# Patient Record
Sex: MALE | Race: WHITE | NOT HISPANIC OR LATINO | Employment: FULL TIME | URBAN - METROPOLITAN AREA
[De-identification: names, ages, dates, MRNs, and addresses within clinical notes are randomized per-mention and may not be internally consistent; named-entity substitution may affect disease eponyms.]

---

## 2021-08-25 ENCOUNTER — TELEPHONE (OUTPATIENT)
Dept: NEUROSURGERY | Facility: CLINIC | Age: 41
End: 2021-08-25

## 2021-08-25 NOTE — TELEPHONE ENCOUNTER
Writer LVM for pt to call back and schedule appt.    Please schedule a New, Virtual appt with Dr. Matson for next available. To schedule with Dr. Matson please use Dept:  ONC ADULT NEUROSRG [[id number]]    Jana Corbett

## 2021-08-26 ENCOUNTER — PRE VISIT (OUTPATIENT)
Dept: NEUROSURGERY | Facility: CLINIC | Age: 41
End: 2021-08-26

## 2021-08-26 ENCOUNTER — VIRTUAL VISIT (OUTPATIENT)
Dept: NEUROSURGERY | Facility: CLINIC | Age: 41
End: 2021-08-26
Attending: NEUROLOGICAL SURGERY
Payer: COMMERCIAL

## 2021-08-26 DIAGNOSIS — G93.89 BRAIN MASS: Primary | ICD-10-CM

## 2021-08-26 PROCEDURE — 99204 OFFICE O/P NEW MOD 45 MIN: CPT | Mod: GT | Performed by: NEUROLOGICAL SURGERY

## 2021-08-26 NOTE — TELEPHONE ENCOUNTER
FUTURE VISIT INFORMATION      FUTURE VISIT INFORMATION:    Date: 8/26/2021    Time: 2pm    Location: [facility] [place]  REFERRAL INFORMATION:    Referring provider:      Referring providers clinic:      Reason for visit/diagnosis      RECORDS REQUESTED FROM:       Clinic name Comments Records Status Imaging Status   [facility]   Care Everywhere Requested          [place]  CT Head-8/21/2021 Care Everywhere PACS                        8/26/2021-Request for ALL images faxed to [facility]-MR @ 710am    8/27/2021-[facility] Images now in PACS-MR @ 550am

## 2021-08-26 NOTE — PROGRESS NOTES
Patient Location [place]  How would you like to obtain your AVS? MyChart  If the video visit is dropped, the invitation should be resent by: Text to cell phone: [phone]  Will anyone else be joining your video visit? Wife    Neurosurgery Clinic Note    41 M with anaplastic oligodendroglioma initially diagnosed in 2014 (s/p subtotal resection followed by RT/TMZ and maintenance TMZ until 2015) who suffered multiple episodes of memory lapse and confusion over the past week. The episodes resolved after institution of Keppra therapy. MRI of the brain on 8/24/2021 revealed patchy new contrast enhancement in the right mesial temporal region as well as a new 4 mm focus of enhancement int right superior temporal gyrus. There is associated FLAIR hyper-intensity surrounding these regions of enhancement. The patient presents for surgical considerations.    Review of systems revealed no episodes of confusion since Keppra was escalated to 1000 mg BID.    Past Medical History:   Diagnosis Date     Elevated blood pressure reading without diagnosis of hypertension        Current Outpatient Medications:      doxycycline (VIBRAMYCIN) 100 MG capsule, Take 1 capsule by mouth 2 times daily. (Patient not taking: Reported on 8/26/2021), Disp: 28 capsule, Rfl: 0  Keppra 1000 mg BID    Allergies   Allergen Reactions     No Known Drug Allergies      Video examination grossly non-focal    MRI of the brain from 8/24/2021 is as above described    AP: 41 M with known anaplastic oligodendroglioma s/p TMZ/RT followed by TMZ, now with MRI findings worrisome for tumor progression. The mesial temporal lesion likely contributed to the risk for seizures. As a symptomatic lesion, I believe stereotactic needle biopsy is warranted. If the intra-operative finding revealed pathology consistent with recurrent tumor, laser ablation can be performed at the same time as means of tumor volume reduction to decrease risk for future seizures. I have reviewed this case  with the patient's neuro-oncologist, Dr. Vitale. Rationale, risks, and benefits of the procedure were reviewed with the patient and his wife. All questions were answered. Informed consent was obtained. I will proceed to schedule the procedure.

## 2021-08-26 NOTE — LETTER
8/26/2021         RE: Brent Pino  [address]  [place] [address]        Dear Colleague,    Thank you for referring your patient, Brent Pino, to the [facility]. Please see a copy of my visit note below.    Neurosurgery Clinic Note    41 M with anaplastic oligodendroglioma initially diagnosed in 2014 (s/p subtotal resection followed by RT/TMZ and maintenance TMZ until 2015) who suffered multiple episodes of memory lapse and confusion over the past week. The episodes resolved after institution of Keppra therapy. MRI of the brain on 8/24/2021 revealed patchy new contrast enhancement in the right mesial temporal region as well as a new 4 mm focus of enhancement int right superior temporal gyrus. There is associated FLAIR hyper-intensity surrounding these regions of enhancement. The patient presents for surgical considerations.    Review of systems revealed no episodes of confusion since Keppra was escalated to 1000 mg BID.    Past Medical History:   Diagnosis Date     Elevated blood pressure reading without diagnosis of hypertension        Current Outpatient Medications:      doxycycline (VIBRAMYCIN) 100 MG capsule, Take 1 capsule by mouth 2 times daily. (Patient not taking: Reported on 8/26/2021), Disp: 28 capsule, Rfl: 0  Keppra 1000 mg BID    Allergies   Allergen Reactions     No Known Drug Allergies      Video examination grossly non-focal    MRI of the brain from 8/24/2021 is as above described    AP: 41 M with known anaplastic oligodendroglioma s/p TMZ/RT followed by TMZ, now with MRI findings worrisome for tumor progression. The mesial temporal lesion likely contributed to the risk for seizures. As a symptomatic lesion, I believe stereotactic needle biopsy is warranted. If the intra-operative finding revealed pathology consistent with recurrent tumor, laser ablation can be performed at the same time as means of tumor volume reduction to decrease risk for  future seizures. I have reviewed this case with the patient's neuro-oncologist, Dr. Vitale. Rationale, risks, and benefits of the procedure were reviewed with the patient and his wife. All questions were answered. Informed consent was obtained. I will proceed to schedule the procedure.      Alejandro Matson MD

## 2021-08-27 ENCOUNTER — PREP FOR PROCEDURE (OUTPATIENT)
Dept: NEUROSURGERY | Facility: CLINIC | Age: 41
End: 2021-08-27

## 2021-08-27 DIAGNOSIS — C71.9 OLIGODENDROGLIOMA (H): Primary | ICD-10-CM

## 2021-08-30 ENCOUNTER — PATIENT OUTREACH (OUTPATIENT)
Dept: CARE COORDINATION | Facility: CLINIC | Age: 41
End: 2021-08-30

## 2021-08-30 NOTE — PROGRESS NOTES
Oncology Distress Screening Follow-up  Clinical Social Work  [facility]    Identified Concern and Score From Distress Screenin. How concerned are you about your ability to eat?   0           2. How concerned are you about unintended weight loss or your current weight?   0           3. How concerned are you about feeling depressed or very sad?   2           4. How concerned are you about feeling anxious or very scared?   3           5. Do you struggle with the loss of meaning and liang in your life?   Not at all           6. How concerned are you about work and home life issues that may be affected by your cancer?   5           7. How concerned are you about knowing what resources are available to help you?   7Abnormal            8. Do you currently have what you would describe as Jain or spiritual struggles?              Not at all           Date of Distress Screenin21    Intervention:   Brent is a 41-year-old with a diagnosis of anaplastic oligoendroglioma that was initially diagnosed in . Brent met with Dr. Matson at [facility] 21, at which time he scored positive on distress screen.     This clinician called Brent with intention of following up on elevated distress screen and introducing role of oncology social worker. This clinician left detailed voicemail with social work contact information and availability.     Education Provided:   Onc SW contact information    Follow-up Required:   SW will continue to be available as needed for ongoing psychosocial support. Will await return call.     IRISH Garcia, [facility] Care[facility]  Phone: [phone]  [facility]: M, Thu  *every other Tue, 8am-4:30pm  [facility]: W, F, *every other Tue, 8am-4:30pm

## 2021-08-31 DIAGNOSIS — Z11.59 ENCOUNTER FOR SCREENING FOR OTHER VIRAL DISEASES: ICD-10-CM

## 2021-08-31 PROBLEM — C71.9 OLIGODENDROGLIOMA (H): Status: ACTIVE | Noted: 2021-08-31

## 2021-08-31 NOTE — TELEPHONE ENCOUNTER
FUTURE VISIT INFORMATION      SURGERY INFORMATION:    Date: 21    Location: uu or    Surgeon:  Alejandro Matson MD    Anesthesia Type:  general    Procedure: MRI LASER ABLATION, WITH OPTICAL TRACKING SYSTEM AND STEREOTACTIC NEEDLE BIOPSY    Consult: Virtual visit 21    RECORDS REQUESTED FROM:       Primary Care Provider: Yong Gaitan MD  - [facility]    Most recent EKG+ Tracin21- [place]

## 2021-09-02 ENCOUNTER — PATIENT OUTREACH (OUTPATIENT)
Dept: ONCOLOGY | Facility: CLINIC | Age: 41
End: 2021-09-02

## 2021-09-02 ENCOUNTER — PRE VISIT (OUTPATIENT)
Dept: SURGERY | Facility: CLINIC | Age: 41
End: 2021-09-02

## 2021-09-02 ENCOUNTER — ANESTHESIA EVENT (OUTPATIENT)
Dept: SURGERY | Facility: CLINIC | Age: 41
DRG: 025 | End: 2021-09-02
Payer: COMMERCIAL

## 2021-09-02 ENCOUNTER — VIRTUAL VISIT (OUTPATIENT)
Dept: SURGERY | Facility: CLINIC | Age: 41
End: 2021-09-02
Payer: COMMERCIAL

## 2021-09-02 DIAGNOSIS — Z01.818 PREOP EXAMINATION: Primary | ICD-10-CM

## 2021-09-02 PROCEDURE — 99204 OFFICE O/P NEW MOD 45 MIN: CPT | Mod: GT | Performed by: PHYSICIAN ASSISTANT

## 2021-09-02 RX ORDER — ATORVASTATIN CALCIUM 40 MG/1
TABLET, FILM COATED ORAL EVERY EVENING
COMMUNITY
Start: 2021-01-20

## 2021-09-02 RX ORDER — IBUPROFEN 200 MG
600 TABLET ORAL DAILY PRN
Status: ON HOLD | COMMUNITY
End: 2021-09-09

## 2021-09-02 RX ORDER — SENNOSIDES 8.6 MG
1300 CAPSULE ORAL EVERY 8 HOURS PRN
COMMUNITY

## 2021-09-02 RX ORDER — LEVETIRACETAM 1000 MG/1
1000 TABLET ORAL
COMMUNITY
Start: 2021-08-25

## 2021-09-02 RX ORDER — SILDENAFIL CITRATE 20 MG/1
TABLET ORAL DAILY PRN
COMMUNITY
Start: 2020-05-28

## 2021-09-02 RX ORDER — HYDROCHLOROTHIAZIDE 12.5 MG/1
25 TABLET ORAL EVERY EVENING
COMMUNITY
Start: 2021-08-19

## 2021-09-02 ASSESSMENT — PAIN SCALES - GENERAL: PAINLEVEL: NO PAIN (0)

## 2021-09-02 ASSESSMENT — LIFESTYLE VARIABLES: TOBACCO_USE: 0

## 2021-09-02 NOTE — H&P
Pre-Operative H & P     CC:  Preoperative exam to assess for increased cardiopulmonary risk while undergoing surgery and anesthesia.    Date of Encounter: 9/2/2021  Primary Care Physician:  Yong Gaitan     Reason for Visit: Oligodendroglioma (H)       Phone-Visit Details    Type of service:  Phone Visit  ** visit switched to phone due to inability to connect via Doximity video.    Patient verbally consented to video service today: YES    Phone Start Time: 1028  Phone End Time (time video stopped): 1053    Originating Location (pt. Location): Home    Distant Location (provider location):  [facility]     Mode of Communication:  Video Conference via Doximity      HPI  Brent Pino is a 42 y/o male who presents for pre-operative H&P in preparation for INTRAOPERATIVE MAGNETIC RESONANCE IMAGING STEALTH ASSISTED (MONTERIS) LASER ABLATION, AND STEREOTACTIC NEEDLE BIOPSY with Alejandro Matson MD on 9/8/21 at [facility] for treatment of Oligodendroglioma (H). Patient is being evaluated for comorbid conditions of HTN, HLD, GERD, obesity.    Mr. Pino is a 41 male with anaplastic oligodendroglioma initially diagnosed in 2014 (s/p subtotal resection followed by RT/TMZ and maintenance TMZ until 2015) who suffered multiple episodes of memory lapse and confusion over the past week. The episodes resolved after institution of Keppra therapy. MRI of the brain on 8/24/2021 revealed patchy new contrast enhancement in the right mesial temporal region as well as a new 4 mm focus of enhancement int right superior temporal gyrus. There is associated FLAIR hyper-intensity surrounding these regions of enhancement. He now presents for the above procedure.    History was obtained from patient & chart review. He is accompanied by his wife.       Hx of abnormal bleeding or anti-platelet use: denies    Menstrual history: No LMP for male patient.:      Prior to Admission  Medications  Current Outpatient Medications   Medication Sig Dispense Refill     acetaminophen (TYLENOL) 650 MG CR tablet Take 1,300 mg by mouth every 8 hours as needed        atorvastatin (LIPITOR) 40 MG tablet every evening        hydrochlorothiazide (HYDRODIURIL) 12.5 MG tablet Take 25 mg by mouth every evening        ibuprofen (ADVIL/MOTRIN) 200 MG tablet Take 600 mg by mouth daily as needed        levETIRAcetam (KEPPRA) 1000 MG tablet Take 1,000 mg by mouth       omeprazole (PRILOSEC) 20 MG DR capsule every evening        sildenafil (REVATIO) 20 MG tablet daily as needed        doxycycline (VIBRAMYCIN) 100 MG capsule Take 1 capsule by mouth 2 times daily. (Patient not taking: Reported on 2021) 28 capsule 0       Family History  Family History   Problem Relation Age of Onset     Hypertension Mother      Lipids Mother      Hypertension Father      Lipids Father      Cardiovascular Maternal Grandmother 89     Myocardial Infarction Maternal Grandfather      Myocardial Infarction Paternal Grandfather 65     Anesthesia Reaction No family hx of      Deep Vein Thrombosis (DVT) No family hx of        The complete review of systems is negative other than noted in the HPI or here.       Anesthesia Pre-Procedure Evaluation    Patient: Brent Pino   MRN: [id number] : 1980        Preoperative Diagnosis: Oligodendroglioma (H) [C71.9]   Procedure : Procedure(s):  INTRAOPERATIVE MAGNETIC RESONANCE IMAGING STEALTH ASSISTED (MONTERIS) LASER ABLATION, AND STEREOTACTIC NEEDLE BIOPSY     Past Medical History:   Diagnosis Date     Gastroesophageal reflux disease without esophagitis      HLD (hyperlipidemia)      HTN (hypertension)      Oligodendroglioma (H)      Seizures (H)       Past Surgical History:   Procedure Laterality Date     ABDOMEN SURGERY  age 5 months.     ARTHROSCOPY KNEE Left      CHOLECYSTECTOMY        Allergies   Allergen Reactions     No Known Drug Allergies       Social History     Tobacco Use      Smoking status: Never Smoker     Smokeless tobacco: Never Used   Substance Use Topics     Alcohol use: Yes     Comment: Social      Wt Readings from Last 1 Encounters:   06/14/12 106.4 kg (234 lb 8 oz)            ROS/MED HX  The complete review of systems is negative other than noted in the HPI or here.  Patient denies recent illness, fever and respiratory infection during past month.  Pt denies steroid use during past year.    ENT/Pulmonary:  - neg pulmonary ROS  (-) tobacco use, asthma and sleep apnea   Neurologic: Comment: Oligodendroglioma, s/p resection 2014    Had multiple episodes of memory lapse and confusion over the past weeks. The episodes resolved after institution of Keppra therapy.   (-) no CVA   Cardiovascular:     (+) hypertension-----Previous cardiac testing     METS/Exercise Tolerance: >4 METS    Hematologic:  - neg hematologic  ROS  (-) history of blood clots and history of blood transfusion   Musculoskeletal: Comment: Left knee OA      GI/Hepatic: Comment: Hx exploratory laparotomy @ 5 months due to suspected ruptured spleen. Unremarkable - symptoms determined to be 2/2 influenza    (+) GERD, Asymptomatic on medication,  (-) liver disease   Renal/Genitourinary: Comment: Creatinine 1.39, GFR >60 on 8/25/21    Hypospadias - Pt has hx of difficult florence placement resulting in surgery delay        Endo:  - neg endo ROS  (-) Type II DM   Psychiatric/Substance Use:  - neg psychiatric ROS     Infectious Disease: Comment: +PPD age 3 y/o, s/p treatment (exposed via dental hygienist)        (+) TB,     Malignancy: Comment: Oligodendroglioma, s/p resection & chemo 2014. Recent imaging shows patchy new contrast enhancement in the right mesial temporal region & new 4 mm focus of enhancement int right superior temporal gyrus    (+) Malignancy, History of Neuro.Neuro CA Active status post Surgery and Chemo.     Other:  - neg other ROS            Virtual visit -  No vitals were obtained       Physical  Exam  Constitutional: Awake, alert, cooperative, no apparent distress, and appears stated age.  Neurologic: Awake, alert, oriented to name, place and time.   Neuropsychiatric: Calm, cooperative. Normal affect. Answers questions appropriately.    ** Patient's visit was done virtually today.  A full physical exam was not completed.  Please refer to the physical examination documented by the anesthesiologist in the anesthesia record on the day of surgery. **        PRIOR LABS/DIAGNOSTIC STUDIES:   All labs and imaging personally reviewed     EKG 8/22/21  NORMAL SINUS RHYTHM   LEFT AXIS DEVIATION   RIGHT BUNDLE BRANCH BLOCK   Bifascicular block   MODERATE VOLTAGE CRITERIA FOR LVH, MAY BE NORMAL VARIANT   ABNORMAL ECG   NO PREVIOUS ECGS AVAILABLE  Ventricular rate 70 bpm    SODIUM 136 - 145 mmol/L 139        POTASSIUM 3.5 - 5.1 mmol/L 4.0        CHLORIDE 98 - 112 mmol/L 105        CARBON DIOXIDE 21 - 32 mmol/L 28        BUN (UREA NITRO) 7 - 24 mg/dL 12        CREATININE 0.70 - 1.30 mg/dL 1.39High         EST GFR (CKD-EPI) >60 mL/min >60        EST GFR IF AFRICAN AM >60 mL/min >60        GLUCOSE 74 - 106 mg/dL 102        CALCIUM, SERUM 8.5 - 10.1 mg/dL 9.7        ANION GAP 0.0 - 15.0 mmol/L 6.0        Resulting Agency  [facility]   Specimen Collected: 08/25/21       WBC 4.3 - 10.8 K/uL 10.7        RBC 4.60 - 6.20 M/uL 5.37        HEMOGLOBIN 14.0 - 18.0 gm/dL 16.1        HEMATOCRIT 40.0 - 54.0 % 45.7        MCV 80 - 100 fl 85        MCH 27 - 33 pg 30        MCHC 33 - 36 gm/dL 35        RDW 11.5 - 14.5 % 12.5        PLATELET COUNT 150 - 400 K/        MPV 6.5 - 12  9.8        PMN %  % 53.8        IG% <=1.0 % 0.4        LYMPH %  % 34.0        MONO %  % 9.6        EOS %  % 1.7        BASO %  % 0.5        PMN ABSOLUTE 1.80 - 7.80 K/uL 5.75        IG ABSOLUTE  K/uL 0.04        LYMPH ABSOLUTE 1.00 - 4.00 K/uL 3.62        MONO ABSOLUTE 0.00 - 1.00 K/uL 1.02High         EOS ABSOLUTE 0.00 - 0.45 K/uL 0.18         BASO ABSOLUTE 0.00 - 0.20 K/uL 0.05        NUCL RBC % 0.0 - 0.0 /100 WBC 0.0        NUCL RBC ABSOLUTE 0.00 - 0.00 K/uL 0.00        Resulting Agency  [facility] LABORATORY   Specimen Collected: 08/25/21           The patient's records and results personally reviewed by this provider.     Outside records reviewed from: Care Everywhere (EKG @ [place]; Provider notes @ [facility])      COVID19 testing to be completed within 4 days of DOS in [place]      ASSESSMENT and PLAN  Brent Pino is a 40 y/o male who presents for pre-operative H&P in preparation for INTRAOPERATIVE MAGNETIC RESONANCE IMAGING STEALTH ASSISTED (MONTERIS) LASER ABLATION, AND STEREOTACTIC NEEDLE BIOPSY with Alejandro Matson MD on 9/8/21 at [facility] for treatment of Oligodendroglioma (H)     Pt has had prior anesthetic.     History of anesthetic complications:  PONV.      He has the following specific operative considerations:   # CUCA 2/8 = low risk  # VTE risk: 1.8%  # Risk of PONV score = 2.  If > 2, anti-emetic intervention recommended.  # Anesthesia considerations:  Refer to PAC assessment in anesthesia records      CARDIAC: METS >4      # RCRI : High risk surgery.  0.9% risk of major adverse cardiac event.     #  RBBB     #  HTN, will hold hydrochlorothiazide DOS      PULMONARY:     # Never smoked    # Denies asthma or inhaler use    GI:     # GERD, asymptomatic on prilosec     # Hx exploratory laparotomy @ 5 months due to suspected ruptured spleen. Unremarkable - symptoms determined to be 2/2 influenza      RENAL:    # Creatinine 1.39, GFR >60 on 8/25/21    # Hypospadias - Pt has hx of difficult florence placement resulting in surgery delay. Consider placement by urology.    ENDO: BMI 37   # No DM    IMMUNE:     # +PPD age 5 y/o, s/p treatment (exposed via dental hygienist)    NEURO/PSYCH:     # Oligodendroglioma, s/p resection 2014    # Had multiple episodes of memory lapse and confusion  over the past weeks. The episodes resolved after institution of Keppra therapy.       Patient is optimized and is acceptable candidate for the proposed procedure. No further diagnostic evaluation is needed.      ** Patient's visit was done virtually today.  A full physical exam was not completed.  Please refer to the physical examination documented by the anesthesiologist in the anesthesia record on the day of surgery. **    Final plan per anesthesiologist on day of surgery.     Arrival time, NPO, shower and medication instructions provided by nursing staff today.  Preparing For Your Surgery handout given.        On the day of service:     Prep time: 15 minutes  Visit time: 25 minutes  Documentation time: 12 minutes  ------------------------------------------  Total time: 52 minutes      Shasha Monae PA-C  Preoperative Assessment Center  [facility]  Clinic and Surgery Center  Phone: [phone]  Fax: [phone]

## 2021-09-02 NOTE — TELEPHONE ENCOUNTER
Order for Covid test faxed to [facility] F: [phone] P: [phone]. Fax confirmation received.    Juana Acevedo, RN, BSN  Neuro-Oncology Care Coordinator  [facility]

## 2021-09-02 NOTE — TELEPHONE ENCOUNTER
FUTURE VISIT INFORMATION        SURGERY INFORMATION:    Date: 21    Location: uu or    Surgeon:  Alejandro Matson MD    Anesthesia Type:  general    Procedure: MRI LASER ABLATION, WITH OPTICAL TRACKING SYSTEM AND STEREOTACTIC NEEDLE BIOPSY    Consult: Virtual visit 21     RECORDS REQUESTED FROM:         Primary Care Provider: Yong Gaitan MD  - [facility]     Most recent EKG+ Tracin21- [place]

## 2021-09-02 NOTE — PATIENT INSTRUCTIONS
Preparing for Your Surgery      Name:  Brent Pino   MRN:  [id number]   :  1980   Today's Date:  2021       Arriving for surgery:  Surgery date:  21  Arrival time:  10:00m am    Restrictions due to COVID 19:       One visitor is allowed in the Pre Op area. When you go into surgery, one visitor is allowed to wait in the Surgery Waiting Room       (provided there is enough space to social distance).   After surgery- Two visitors are allowed at a time if you have a private room and one visitor is allowed for those in a semi-private room.   Every 4 days the visitor(s) can rotate. During the 4 day period, the visitor(s) must be consistent. No visitors under the age of 18 years old.   Visiting Hours: 8 am - 8:30 pm   No ill visitors.   All visitors must wear face mask.    [employer] parking is available for anyone with mobility limitations or disabilities.  ([place]  24 hours/ 7 days a week; [facility]  7 am- 3:30 pm, Mon- Fri)    Please come to:     [facility] [address]  [address] [place] [address]  -    Please proceed to [address] on the 3rd floor. [phone]?     - ?If you are in need of directions, wheelchair or escort please stop at the Information Desk in the lobby.  Inform the information person that you are here for surgery; a wheelchair and escort to [address] will be provided.?     What can I eat or drink?  -  You may eat and drink normally for up to 8 hours before your surgery.   -  You may have clear liquids until 2 hours before surgery.    Examples of clear liquids:  Water  Clear broth  Juices (apple, white grape, white cranberry  and cider) without pulp  Noncarbonated, powder based beverages  (lemonade and Juanito-Aid)  Sodas (Sprite, 7-Up, ginger ale and seltzer)  Coffee or tea (without milk or cream)  Gatorade    -  No Alcohol for at least 24 hours before surgery     Which medicines can I take?    Hold Aspirin for 7 days  before surgery.   Hold Multivitamins for 7 days before surgery.  Hold Supplements for 7 days before surgery.  Hold Ibuprofen (Advil, Motrin) for 1 day before surgery--unless otherwise directed by surgeon.  Hold Naproxen (Aleve) for 4 days before surgery.    -  DO NOT take these medications the day of surgery:  Hydrodiuril.  -  PLEASE TAKE these medications the day of surgery:  Tylenol if needed; take other morning medications.    How do I prepare myself?  - Please take 2 showers before surgery using Scrubcare or Hibiclens soap.    Use this soap only from the neck to your toes.     Leave the soap on your skin for one minute--then rinse thoroughly.      You may use your own shampoo and conditioner; no other hair products.   - Please remove all jewelry and body piercings.  - No lotions, deodorants or fragrance.  - No makeup or fingernail polish.   - Bring your ID and insurance card.    -If you have a Deep Brain Stimulator, Spinal Cord Stimulator or any neuro stimulator device---you must bring the remote control to the hospital     - All patients are required to have a Covid-19 test within 4 days of surgery/procedure.      -Patients will be contacted by the [facility] scheduling team within 1 week of surgery to make an appointment.      - Patients may call the Scheduling team at [phone] if they have not been scheduled within 4 days of  surgery.      ALL PATIENTS GOING HOME THE SAME DAY OF SURGERY ARE REQUIRED TO HAVE A RESPONSIBLE ADULT TO DRIVE AND BE IN ATTENDANCE WITH THEM FOR 24 HOURS FOLLOWING SURGERY.      Questions or Concerns:    - For any questions regarding the day of surgery or your hospital stay, please contact the Pre Admission Nursing Office at [phone].       - If you have health changes between today and your surgery please call your surgeon.       For questions after surgery please call your surgeons office.

## 2021-09-02 NOTE — PROGRESS NOTES
Brent is a 41 year old who is being evaluated via a billable video visit.      How would you like to obtain your AVS? Mail a copy and Email: [email or web address]   If the video visit is dropped, the invitation should be resent by: Text to cell phone: [phone]      HPI         Review of Systems         Physical Exam

## 2021-09-02 NOTE — ANESTHESIA PREPROCEDURE EVALUATION
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     ARTHROSCOPY KNEE Left      CHOLECYSTECTOMY        Allergies   Allergen Reactions     No Known Drug Allergies       Social History     Tobacco Use     Smoking status: Never Smoker     Smokeless tobacco: Never Used   Substance Use Topics     Alcohol use: Yes     Comment: Social      Wt Readings from Last 1 Encounters:   12 106.4 kg (234 lb 8 oz)        Anesthesia Evaluation   Pt has had prior anesthetic.     History of anesthetic complications  - PONV.      ROS/MED HX  ENT/Pulmonary:  - neg pulmonary ROS  (-) tobacco use, asthma and sleep apnea   Neurologic: Comment: Oligodendroglioma, s/p resection 2014    Had multiple episodes of memory lapse and confusion over the past weeks. The episodes resolved after institution of Keppra therapy.   (-) no CVA   Cardiovascular:     (+) hypertension-----Previous cardiac testing     METS/Exercise Tolerance: >4 METS    Hematologic:  - neg hematologic  ROS  (-) history of blood clots and history of blood transfusion   Musculoskeletal: Comment: Left knee OA      GI/Hepatic: Comment: Hx exploratory laparotomy @ 5 months due to suspected ruptured spleen. Unremarkable - symptoms determined to be 2/2 influenza    (+) GERD, Asymptomatic on medication,  (-) liver disease   Renal/Genitourinary: Comment: Creatinine 1.39, GFR >60 on 21    Hypospadias - Pt has hx of difficult florence placement resulting  in surgery delay        Endo:  - neg endo ROS  (-) Type II DM   Psychiatric/Substance Use:  - neg psychiatric ROS     Infectious Disease: Comment: +PPD age 5 y/o, s/p treatment (exposed via dental hygienist)        (+) TB,     Malignancy: Comment: Oligodendroglioma, s/p resection & chemo 2014. Recent imaging shows patchy new contrast enhancement in the right mesial temporal region & new 4 mm focus of enhancement int right superior temporal gyrus    (+) Malignancy, History of Neuro.Neuro CA Active status post Surgery and Chemo.     Other:  - neg other ROS          Physical Exam    Airway        Mallampati: II   TM distance: > 3 FB      Respiratory Devices and Support         Dental  no notable dental history         Cardiovascular   cardiovascular exam normal          Pulmonary   pulmonary exam normal                OUTSIDE LABS:  CBC:   Lab Results   Component Value Date    WBC 8.3 06/14/2012    HGB 14.5 06/14/2012    HCT 42.6 06/14/2012     06/14/2012     BMP: No results found for: NA, POTASSIUM, CHLORIDE, CO2, BUN, CR, GLC  COAGS: No results found for: PTT, INR, FIBR  POC: No results found for: BGM, HCG, HCGS  HEPATIC: No results found for: ALBUMIN, PROTTOTAL, ALT, AST, GGT, ALKPHOS, BILITOTAL, BILIDIRECT, AGUSTO  OTHER: No results found for: PH, LACT, A1C, JEMAL, PHOS, MAG, LIPASE, AMYLASE, TSH, T4, T3, CRP, SED    Anesthesia Plan    ASA Status:  3   NPO Status:  NPO Appropriate    Anesthesia Type: General.     - Airway: ETT   Induction: Intravenous.   Maintenance: Balanced.   Techniques and Equipment:     - Lines/Monitors: 2nd IV, Arterial Line     Consents    Anesthesia Plan(s) and associated risks, benefits, and realistic alternatives discussed. Questions answered and patient/representative(s) expressed understanding.     - Discussed with:  Patient      - Extended Intubation/Ventilatory Support Discussed: No.      - Patient is DNR/DNI Status: No    Use of blood products discussed: Yes.     - Discussed  with: Patient.     Postoperative Care    Pain management: IV analgesics.   PONV prophylaxis: Ondansetron (or other 5HT-3), Background Propofol Infusion, Dexamethasone or Solumedrol     Comments:              PAC Discussion and Assessment    ASA Classification: 3  Case is suitable for: [place]  Anesthetic techniques and relevant risks discussed: GA  Invasive monitoring and risk discussed: No    Possibility and Risk of blood transfusion discussed: No            PAC Resident/NP Anesthesia Assessment: Brent Pino is a 40 y/o male who presents for pre-operative H&P in preparation for INTRAOPERATIVE MAGNETIC RESONANCE IMAGING STEALTH ASSISTED (MONTERIS) LASER ABLATION, AND STEREOTACTIC NEEDLE BIOPSY with Alejandro Matson MD on 9/8/21 at [facility] for treatment of Oligodendroglioma (H)     Pt has had prior anesthetic.     History of anesthetic complications:  PONV.      He has the following specific operative considerations:   # CUCA 2/8 = low risk  # VTE risk: 1.8%  # Risk of PONV score = 2.  If > 2, anti-emetic intervention recommended.  # Anesthesia considerations:  Refer to PAC assessment in anesthesia records      CARDIAC: METS >4      # RCRI : High risk surgery.  0.9% risk of major adverse cardiac event.     #  RBBB     #  HTN, will hold hydrochlorothiazide DOS      PULMONARY:     # Never smoked    # Denies asthma or inhaler use    GI:     # GERD, asymptomatic on prilosec     # Hx exploratory laparotomy @ 5 months due to suspected ruptured spleen. Unremarkable - symptoms determined to be 2/2 influenza      RENAL:    # Creatinine 1.39, GFR >60 on 8/25/21    # Hypospadias - Pt has hx of difficult florence placement resulting in surgery delay. Consider placement by urology.    ENDO: BMI 37   # No DM    IMMUNE:     # +PPD age 3 y/o, s/p treatment (exposed via dental hygienist)    NEURO/PSYCH:     # Oligodendroglioma, s/p resection 2014    # Had multiple episodes of memory lapse and  confusion over the past weeks. The episodes resolved after institution of Keppra therapy.       Patient is optimized and is acceptable candidate for the proposed procedure. No further diagnostic evaluation is needed.      ** Patient's visit was done virtually today.  A full physical exam was not completed.  Please refer to the physical examination documented by the anesthesiologist in the anesthesia record on the day of surgery. **    Final plan per anesthesiologist on day of surgery.     Reviewed and Signed by PAC Mid-Level Provider/Resident  Mid-Level Provider/Resident: Shasha Monae PA-C  Date: 9/2/21  Time: 1151      Reviewed and Signed by PAC Anesthesiologist  Anesthesiologist: Jacquie  Date: 9/2/21                     Shasha Monae PA-C

## 2021-09-08 ENCOUNTER — PRE VISIT (OUTPATIENT)
Dept: SURGERY | Facility: CLINIC | Age: 41
End: 2021-09-08

## 2021-09-08 ENCOUNTER — HOSPITAL ENCOUNTER (INPATIENT)
Facility: CLINIC | Age: 41
LOS: 1 days | Discharge: HOME | DRG: 025 | End: 2021-09-09
Attending: NEUROLOGICAL SURGERY | Admitting: NEUROLOGICAL SURGERY
Payer: COMMERCIAL

## 2021-09-08 ENCOUNTER — HOSPITAL ENCOUNTER (INPATIENT)
Dept: MRI IMAGING | Facility: CLINIC | Age: 41
Setting detail: SURGERY ADMIT
DRG: 025 | End: 2021-09-08
Attending: NEUROLOGICAL SURGERY | Admitting: NEUROLOGICAL SURGERY
Payer: COMMERCIAL

## 2021-09-08 ENCOUNTER — APPOINTMENT (OUTPATIENT)
Dept: CT IMAGING | Facility: CLINIC | Age: 41
DRG: 025 | End: 2021-09-08
Attending: STUDENT IN AN ORGANIZED HEALTH CARE EDUCATION/TRAINING PROGRAM
Payer: COMMERCIAL

## 2021-09-08 ENCOUNTER — HOSPITAL ENCOUNTER (OUTPATIENT)
Dept: MRI IMAGING | Facility: CLINIC | Age: 41
DRG: 025 | End: 2021-09-08
Attending: NEUROLOGICAL SURGERY | Admitting: NEUROLOGICAL SURGERY
Payer: COMMERCIAL

## 2021-09-08 ENCOUNTER — ANESTHESIA (OUTPATIENT)
Dept: SURGERY | Facility: CLINIC | Age: 41
DRG: 025 | End: 2021-09-08
Payer: COMMERCIAL

## 2021-09-08 DIAGNOSIS — C71.9 OLIGODENDROGLIOMA (H): ICD-10-CM

## 2021-09-08 DIAGNOSIS — G93.89 BRAIN MASS: ICD-10-CM

## 2021-09-08 LAB
GLUCOSE BLDC GLUCOMTR-MCNC: 108 MG/DL (ref 70–99)
GLUCOSE BLDC GLUCOMTR-MCNC: 131 MG/DL (ref 70–99)
POTASSIUM BLD-SCNC: 3.5 MMOL/L (ref 3.4–5.3)

## 2021-09-08 PROCEDURE — 250N000009 HC RX 250: Performed by: STUDENT IN AN ORGANIZED HEALTH CARE EDUCATION/TRAINING PROGRAM

## 2021-09-08 PROCEDURE — 0T9B70Z DRAINAGE OF BLADDER WITH DRAINAGE DEVICE, VIA NATURAL OR ARTIFICIAL OPENING: ICD-10-PCS | Performed by: STUDENT IN AN ORGANIZED HEALTH CARE EDUCATION/TRAINING PROGRAM

## 2021-09-08 PROCEDURE — 258N000003 HC RX IP 258 OP 636: Performed by: STUDENT IN AN ORGANIZED HEALTH CARE EDUCATION/TRAINING PROGRAM

## 2021-09-08 PROCEDURE — 250N000011 HC RX IP 250 OP 636: Performed by: STUDENT IN AN ORGANIZED HEALTH CARE EDUCATION/TRAINING PROGRAM

## 2021-09-08 PROCEDURE — 64999 UNLISTED PX NERVOUS SYSTEM: CPT | Performed by: NEUROLOGICAL SURGERY

## 2021-09-08 PROCEDURE — 70450 CT HEAD/BRAIN W/O DYE: CPT

## 2021-09-08 PROCEDURE — 250N000013 HC RX MED GY IP 250 OP 250 PS 637: Performed by: STUDENT IN AN ORGANIZED HEALTH CARE EDUCATION/TRAINING PROGRAM

## 2021-09-08 PROCEDURE — 999N000157 HC STATISTIC RCP TIME EA 10 MIN

## 2021-09-08 PROCEDURE — D0Y0KZZ LASER INTERSTITIAL THERMAL THERAPY OF BRAIN: ICD-10-PCS | Performed by: NEUROLOGICAL SURGERY

## 2021-09-08 PROCEDURE — 250N000025 HC SEVOFLURANE, PER MIN: Performed by: NEUROLOGICAL SURGERY

## 2021-09-08 PROCEDURE — 250N000011 HC RX IP 250 OP 636: Performed by: NURSE ANESTHETIST, CERTIFIED REGISTERED

## 2021-09-08 PROCEDURE — 200N000002 HC R&B ICU UMMC

## 2021-09-08 PROCEDURE — 710N000010 HC RECOVERY PHASE 1, LEVEL 2, PER MIN: Performed by: NEUROLOGICAL SURGERY

## 2021-09-08 PROCEDURE — 250N000009 HC RX 250: Performed by: NURSE ANESTHETIST, CERTIFIED REGISTERED

## 2021-09-08 PROCEDURE — 999N000141 HC STATISTIC PRE-PROCEDURE NURSING ASSESSMENT: Performed by: NEUROLOGICAL SURGERY

## 2021-09-08 PROCEDURE — 36415 COLL VENOUS BLD VENIPUNCTURE: CPT | Performed by: ANESTHESIOLOGY

## 2021-09-08 PROCEDURE — 70450 CT HEAD/BRAIN W/O DYE: CPT | Mod: 26 | Performed by: RADIOLOGY

## 2021-09-08 PROCEDURE — 255N000002 HC RX 255 OP 636: Performed by: NEUROLOGICAL SURGERY

## 2021-09-08 PROCEDURE — 70552 MRI BRAIN STEM W/DYE: CPT

## 2021-09-08 PROCEDURE — 51702 INSERT TEMP BLADDER CATH: CPT | Performed by: STUDENT IN AN ORGANIZED HEALTH CARE EDUCATION/TRAINING PROGRAM

## 2021-09-08 PROCEDURE — 70552 MRI BRAIN STEM W/DYE: CPT | Mod: 26 | Performed by: RADIOLOGY

## 2021-09-08 PROCEDURE — 999N000015 HC STATISTIC ARTERIAL MONITORING DAILY

## 2021-09-08 PROCEDURE — 272N000001 HC OR GENERAL SUPPLY STERILE: Performed by: NEUROLOGICAL SURGERY

## 2021-09-08 PROCEDURE — 88331 PATH CONSLTJ SURG 1 BLK 1SPC: CPT | Mod: TC | Performed by: NEUROLOGICAL SURGERY

## 2021-09-08 PROCEDURE — A9585 GADOBUTROL INJECTION: HCPCS | Performed by: NEUROLOGICAL SURGERY

## 2021-09-08 PROCEDURE — 8E09XBH COMPUTER ASSISTED PROCEDURE OF HEAD AND NECK REGION, WITH MAGNETIC RESONANCE IMAGING: ICD-10-PCS | Performed by: NEUROLOGICAL SURGERY

## 2021-09-08 PROCEDURE — 370N000017 HC ANESTHESIA TECHNICAL FEE, PER MIN: Performed by: NEUROLOGICAL SURGERY

## 2021-09-08 PROCEDURE — 250N000011 HC RX IP 250 OP 636: Performed by: ANESTHESIOLOGY

## 2021-09-08 PROCEDURE — 00B73ZX EXCISION OF CEREBRAL HEMISPHERE, PERCUTANEOUS APPROACH, DIAGNOSTIC: ICD-10-PCS | Performed by: NEUROLOGICAL SURGERY

## 2021-09-08 PROCEDURE — 360N000080 HC SURGERY LEVEL 7, PER MIN: Performed by: NEUROLOGICAL SURGERY

## 2021-09-08 PROCEDURE — 272N000002 HC OR SUPPLY OTHER OPNP: Performed by: NEUROLOGICAL SURGERY

## 2021-09-08 PROCEDURE — 84132 ASSAY OF SERUM POTASSIUM: CPT | Performed by: ANESTHESIOLOGY

## 2021-09-08 RX ORDER — NALOXONE HYDROCHLORIDE 0.4 MG/ML
0.2 INJECTION, SOLUTION INTRAMUSCULAR; INTRAVENOUS; SUBCUTANEOUS
Status: DISCONTINUED | OUTPATIENT
Start: 2021-09-08 | End: 2021-09-09 | Stop reason: HOSPADM

## 2021-09-08 RX ORDER — LABETALOL HYDROCHLORIDE 5 MG/ML
10-40 INJECTION, SOLUTION INTRAVENOUS EVERY 10 MIN PRN
Status: DISCONTINUED | OUTPATIENT
Start: 2021-09-08 | End: 2021-09-09 | Stop reason: HOSPADM

## 2021-09-08 RX ORDER — DEXAMETHASONE SODIUM PHOSPHATE 4 MG/ML
10 INJECTION, SOLUTION INTRA-ARTICULAR; INTRALESIONAL; INTRAMUSCULAR; INTRAVENOUS; SOFT TISSUE ONCE
Status: COMPLETED | OUTPATIENT
Start: 2021-09-08 | End: 2021-09-08

## 2021-09-08 RX ORDER — CEFAZOLIN SODIUM 2 G/100ML
2 INJECTION, SOLUTION INTRAVENOUS SEE ADMIN INSTRUCTIONS
Status: DISCONTINUED | OUTPATIENT
Start: 2021-09-08 | End: 2021-09-08 | Stop reason: HOSPADM

## 2021-09-08 RX ORDER — OXYCODONE HYDROCHLORIDE 10 MG/1
10 TABLET ORAL EVERY 4 HOURS PRN
Status: DISCONTINUED | OUTPATIENT
Start: 2021-09-08 | End: 2021-09-09 | Stop reason: HOSPADM

## 2021-09-08 RX ORDER — SODIUM CHLORIDE 9 MG/ML
INJECTION, SOLUTION INTRAVENOUS CONTINUOUS
Status: DISCONTINUED | OUTPATIENT
Start: 2021-09-08 | End: 2021-09-09 | Stop reason: HOSPADM

## 2021-09-08 RX ORDER — LIDOCAINE HYDROCHLORIDE 20 MG/ML
INJECTION, SOLUTION INFILTRATION; PERINEURAL PRN
Status: DISCONTINUED | OUTPATIENT
Start: 2021-09-08 | End: 2021-09-08

## 2021-09-08 RX ORDER — DEXAMETHASONE SODIUM PHOSPHATE 4 MG/ML
1 INJECTION, SOLUTION INTRA-ARTICULAR; INTRALESIONAL; INTRAMUSCULAR; INTRAVENOUS; SOFT TISSUE EVERY 8 HOURS
Status: DISCONTINUED | OUTPATIENT
Start: 2021-09-14 | End: 2021-09-09

## 2021-09-08 RX ORDER — MEPERIDINE HYDROCHLORIDE 25 MG/ML
12.5 INJECTION INTRAMUSCULAR; INTRAVENOUS; SUBCUTANEOUS
Status: DISCONTINUED | OUTPATIENT
Start: 2021-09-08 | End: 2021-09-08 | Stop reason: HOSPADM

## 2021-09-08 RX ORDER — PROCHLORPERAZINE MALEATE 5 MG
10 TABLET ORAL EVERY 6 HOURS PRN
Status: DISCONTINUED | OUTPATIENT
Start: 2021-09-08 | End: 2021-09-09 | Stop reason: HOSPADM

## 2021-09-08 RX ORDER — ONDANSETRON 4 MG/1
4 TABLET, ORALLY DISINTEGRATING ORAL EVERY 30 MIN PRN
Status: DISCONTINUED | OUTPATIENT
Start: 2021-09-08 | End: 2021-09-08 | Stop reason: HOSPADM

## 2021-09-08 RX ORDER — ONDANSETRON 2 MG/ML
4 INJECTION INTRAMUSCULAR; INTRAVENOUS EVERY 30 MIN PRN
Status: DISCONTINUED | OUTPATIENT
Start: 2021-09-08 | End: 2021-09-08 | Stop reason: HOSPADM

## 2021-09-08 RX ORDER — LIDOCAINE 40 MG/G
CREAM TOPICAL
Status: DISCONTINUED | OUTPATIENT
Start: 2021-09-08 | End: 2021-09-08 | Stop reason: HOSPADM

## 2021-09-08 RX ORDER — NALOXONE HYDROCHLORIDE 0.4 MG/ML
0.4 INJECTION, SOLUTION INTRAMUSCULAR; INTRAVENOUS; SUBCUTANEOUS
Status: DISCONTINUED | OUTPATIENT
Start: 2021-09-08 | End: 2021-09-09 | Stop reason: HOSPADM

## 2021-09-08 RX ORDER — HYDRALAZINE HYDROCHLORIDE 20 MG/ML
5 INJECTION INTRAMUSCULAR; INTRAVENOUS ONCE
Status: COMPLETED | OUTPATIENT
Start: 2021-09-08 | End: 2021-09-08

## 2021-09-08 RX ORDER — DEXAMETHASONE SODIUM PHOSPHATE 4 MG/ML
3 INJECTION, SOLUTION INTRA-ARTICULAR; INTRALESIONAL; INTRAMUSCULAR; INTRAVENOUS; SOFT TISSUE EVERY 8 HOURS
Status: DISCONTINUED | OUTPATIENT
Start: 2021-09-10 | End: 2021-09-09

## 2021-09-08 RX ORDER — METOPROLOL TARTRATE 1 MG/ML
1-2 INJECTION, SOLUTION INTRAVENOUS EVERY 5 MIN PRN
Status: DISCONTINUED | OUTPATIENT
Start: 2021-09-08 | End: 2021-09-08 | Stop reason: HOSPADM

## 2021-09-08 RX ORDER — HYDROMORPHONE HCL IN WATER/PF 6 MG/30 ML
0.4 PATIENT CONTROLLED ANALGESIA SYRINGE INTRAVENOUS
Status: DISCONTINUED | OUTPATIENT
Start: 2021-09-08 | End: 2021-09-09 | Stop reason: HOSPADM

## 2021-09-08 RX ORDER — OXYCODONE HYDROCHLORIDE 5 MG/1
5 TABLET ORAL EVERY 4 HOURS PRN
Status: DISCONTINUED | OUTPATIENT
Start: 2021-09-08 | End: 2021-09-08 | Stop reason: HOSPADM

## 2021-09-08 RX ORDER — DEXAMETHASONE SODIUM PHOSPHATE 4 MG/ML
2 INJECTION, SOLUTION INTRA-ARTICULAR; INTRALESIONAL; INTRAMUSCULAR; INTRAVENOUS; SOFT TISSUE EVERY 8 HOURS
Status: DISCONTINUED | OUTPATIENT
Start: 2021-09-12 | End: 2021-09-09

## 2021-09-08 RX ORDER — HYDROMORPHONE HCL IN WATER/PF 6 MG/30 ML
0.2 PATIENT CONTROLLED ANALGESIA SYRINGE INTRAVENOUS EVERY 5 MIN PRN
Status: DISCONTINUED | OUTPATIENT
Start: 2021-09-08 | End: 2021-09-08 | Stop reason: HOSPADM

## 2021-09-08 RX ORDER — HYDRALAZINE HYDROCHLORIDE 20 MG/ML
10-20 INJECTION INTRAMUSCULAR; INTRAVENOUS EVERY 30 MIN PRN
Status: DISCONTINUED | OUTPATIENT
Start: 2021-09-08 | End: 2021-09-09 | Stop reason: HOSPADM

## 2021-09-08 RX ORDER — ONDANSETRON 4 MG/1
4 TABLET, ORALLY DISINTEGRATING ORAL EVERY 6 HOURS PRN
Status: DISCONTINUED | OUTPATIENT
Start: 2021-09-08 | End: 2021-09-09 | Stop reason: HOSPADM

## 2021-09-08 RX ORDER — PHENYLEPHRINE HCL IN 0.9% NACL 50MG/250ML
.1-1 PLASTIC BAG, INJECTION (ML) INTRAVENOUS CONTINUOUS
Status: DISCONTINUED | OUTPATIENT
Start: 2021-09-08 | End: 2021-09-08 | Stop reason: HOSPADM

## 2021-09-08 RX ORDER — ATORVASTATIN CALCIUM 40 MG/1
40 TABLET, FILM COATED ORAL EVERY EVENING
Status: DISCONTINUED | OUTPATIENT
Start: 2021-09-08 | End: 2021-09-09 | Stop reason: HOSPADM

## 2021-09-08 RX ORDER — EPHEDRINE SULFATE 50 MG/ML
INJECTION, SOLUTION INTRAMUSCULAR; INTRAVENOUS; SUBCUTANEOUS PRN
Status: DISCONTINUED | OUTPATIENT
Start: 2021-09-08 | End: 2021-09-08

## 2021-09-08 RX ORDER — LIDOCAINE 40 MG/G
CREAM TOPICAL
Status: DISCONTINUED | OUTPATIENT
Start: 2021-09-08 | End: 2021-09-09 | Stop reason: HOSPADM

## 2021-09-08 RX ORDER — AMOXICILLIN 250 MG
1 CAPSULE ORAL 2 TIMES DAILY
Status: DISCONTINUED | OUTPATIENT
Start: 2021-09-08 | End: 2021-09-09 | Stop reason: HOSPADM

## 2021-09-08 RX ORDER — ACETAMINOPHEN 325 MG/1
650 TABLET ORAL EVERY 4 HOURS PRN
Status: DISCONTINUED | OUTPATIENT
Start: 2021-09-11 | End: 2021-09-09 | Stop reason: HOSPADM

## 2021-09-08 RX ORDER — METOPROLOL TARTRATE 1 MG/ML
INJECTION, SOLUTION INTRAVENOUS PRN
Status: DISCONTINUED | OUTPATIENT
Start: 2021-09-08 | End: 2021-09-08

## 2021-09-08 RX ORDER — BISACODYL 10 MG
10 SUPPOSITORY, RECTAL RECTAL DAILY PRN
Status: DISCONTINUED | OUTPATIENT
Start: 2021-09-08 | End: 2021-09-09 | Stop reason: HOSPADM

## 2021-09-08 RX ORDER — SODIUM CHLORIDE, SODIUM LACTATE, POTASSIUM CHLORIDE, CALCIUM CHLORIDE 600; 310; 30; 20 MG/100ML; MG/100ML; MG/100ML; MG/100ML
INJECTION, SOLUTION INTRAVENOUS CONTINUOUS
Status: DISCONTINUED | OUTPATIENT
Start: 2021-09-08 | End: 2021-09-08 | Stop reason: HOSPADM

## 2021-09-08 RX ORDER — LEVETIRACETAM 10 MG/ML
1000 INJECTION INTRAVASCULAR ONCE
Status: COMPLETED | OUTPATIENT
Start: 2021-09-08 | End: 2021-09-08

## 2021-09-08 RX ORDER — FENTANYL CITRATE 50 UG/ML
25 INJECTION, SOLUTION INTRAMUSCULAR; INTRAVENOUS
Status: DISCONTINUED | OUTPATIENT
Start: 2021-09-08 | End: 2021-09-08 | Stop reason: HOSPADM

## 2021-09-08 RX ORDER — LEVETIRACETAM 500 MG/1
1000 TABLET ORAL 2 TIMES DAILY
Status: DISCONTINUED | OUTPATIENT
Start: 2021-09-08 | End: 2021-09-09 | Stop reason: HOSPADM

## 2021-09-08 RX ORDER — POLYETHYLENE GLYCOL 3350 17 G/17G
17 POWDER, FOR SOLUTION ORAL DAILY
Status: DISCONTINUED | OUTPATIENT
Start: 2021-09-09 | End: 2021-09-09 | Stop reason: HOSPADM

## 2021-09-08 RX ORDER — HYDROCHLOROTHIAZIDE 25 MG/1
25 TABLET ORAL EVERY EVENING
Status: DISCONTINUED | OUTPATIENT
Start: 2021-09-09 | End: 2021-09-09 | Stop reason: HOSPADM

## 2021-09-08 RX ORDER — CEFAZOLIN SODIUM 1 G/3ML
1 INJECTION, POWDER, FOR SOLUTION INTRAMUSCULAR; INTRAVENOUS EVERY 8 HOURS
Status: DISCONTINUED | OUTPATIENT
Start: 2021-09-08 | End: 2021-09-09 | Stop reason: HOSPADM

## 2021-09-08 RX ORDER — FENTANYL CITRATE 50 UG/ML
25 INJECTION, SOLUTION INTRAMUSCULAR; INTRAVENOUS EVERY 5 MIN PRN
Status: DISCONTINUED | OUTPATIENT
Start: 2021-09-08 | End: 2021-09-08 | Stop reason: HOSPADM

## 2021-09-08 RX ORDER — FENTANYL CITRATE 50 UG/ML
INJECTION, SOLUTION INTRAMUSCULAR; INTRAVENOUS PRN
Status: DISCONTINUED | OUTPATIENT
Start: 2021-09-08 | End: 2021-09-08

## 2021-09-08 RX ORDER — OXYCODONE HYDROCHLORIDE 5 MG/1
5 TABLET ORAL EVERY 4 HOURS PRN
Status: DISCONTINUED | OUTPATIENT
Start: 2021-09-08 | End: 2021-09-09 | Stop reason: HOSPADM

## 2021-09-08 RX ORDER — GADOBUTROL 604.72 MG/ML
7.5 INJECTION INTRAVENOUS ONCE
Status: COMPLETED | OUTPATIENT
Start: 2021-09-08 | End: 2021-09-08

## 2021-09-08 RX ORDER — HYDROMORPHONE HCL IN WATER/PF 6 MG/30 ML
0.2 PATIENT CONTROLLED ANALGESIA SYRINGE INTRAVENOUS
Status: DISCONTINUED | OUTPATIENT
Start: 2021-09-08 | End: 2021-09-09 | Stop reason: HOSPADM

## 2021-09-08 RX ORDER — ONDANSETRON 2 MG/ML
4 INJECTION INTRAMUSCULAR; INTRAVENOUS EVERY 6 HOURS PRN
Status: DISCONTINUED | OUTPATIENT
Start: 2021-09-08 | End: 2021-09-09 | Stop reason: HOSPADM

## 2021-09-08 RX ORDER — PROPOFOL 10 MG/ML
INJECTION, EMULSION INTRAVENOUS CONTINUOUS PRN
Status: DISCONTINUED | OUTPATIENT
Start: 2021-09-08 | End: 2021-09-08

## 2021-09-08 RX ORDER — ACETAMINOPHEN 325 MG/1
975 TABLET ORAL EVERY 8 HOURS
Status: DISCONTINUED | OUTPATIENT
Start: 2021-09-08 | End: 2021-09-09 | Stop reason: HOSPADM

## 2021-09-08 RX ORDER — DEXAMETHASONE SODIUM PHOSPHATE 4 MG/ML
4 INJECTION, SOLUTION INTRA-ARTICULAR; INTRALESIONAL; INTRAMUSCULAR; INTRAVENOUS; SOFT TISSUE EVERY 8 HOURS
Status: DISCONTINUED | OUTPATIENT
Start: 2021-09-08 | End: 2021-09-09

## 2021-09-08 RX ORDER — ESMOLOL HYDROCHLORIDE 10 MG/ML
INJECTION INTRAVENOUS PRN
Status: DISCONTINUED | OUTPATIENT
Start: 2021-09-08 | End: 2021-09-08

## 2021-09-08 RX ORDER — GADOBUTROL 604.72 MG/ML
10 INJECTION INTRAVENOUS ONCE
Status: COMPLETED | OUTPATIENT
Start: 2021-09-08 | End: 2021-09-08

## 2021-09-08 RX ORDER — ONDANSETRON 2 MG/ML
INJECTION INTRAMUSCULAR; INTRAVENOUS PRN
Status: DISCONTINUED | OUTPATIENT
Start: 2021-09-08 | End: 2021-09-08

## 2021-09-08 RX ORDER — PROPOFOL 10 MG/ML
INJECTION, EMULSION INTRAVENOUS PRN
Status: DISCONTINUED | OUTPATIENT
Start: 2021-09-08 | End: 2021-09-08

## 2021-09-08 RX ORDER — CEFAZOLIN SODIUM 2 G/100ML
2 INJECTION, SOLUTION INTRAVENOUS
Status: DISCONTINUED | OUTPATIENT
Start: 2021-09-08 | End: 2021-09-08 | Stop reason: HOSPADM

## 2021-09-08 RX ORDER — ACETAMINOPHEN 325 MG/1
975 TABLET ORAL ONCE
Status: COMPLETED | OUTPATIENT
Start: 2021-09-08 | End: 2021-09-08

## 2021-09-08 RX ADMIN — GADOBUTROL 5 ML: 604.72 INJECTION INTRAVENOUS at 16:29

## 2021-09-08 RX ADMIN — FENTANYL CITRATE 100 MCG: 50 INJECTION, SOLUTION INTRAMUSCULAR; INTRAVENOUS at 08:38

## 2021-09-08 RX ADMIN — METOPROLOL TARTRATE 2 MG: 5 INJECTION INTRAVENOUS at 17:30

## 2021-09-08 RX ADMIN — CEFAZOLIN 1 G: 1 INJECTION, POWDER, FOR SOLUTION INTRAMUSCULAR; INTRAVENOUS at 21:14

## 2021-09-08 RX ADMIN — LIDOCAINE HYDROCHLORIDE 100 MG: 20 INJECTION, SOLUTION INFILTRATION; PERINEURAL at 08:12

## 2021-09-08 RX ADMIN — SODIUM CHLORIDE, POTASSIUM CHLORIDE, SODIUM LACTATE AND CALCIUM CHLORIDE: 600; 310; 30; 20 INJECTION, SOLUTION INTRAVENOUS at 08:02

## 2021-09-08 RX ADMIN — ESMOLOL HYDROCHLORIDE 30 MG: 10 INJECTION, SOLUTION INTRAVENOUS at 08:50

## 2021-09-08 RX ADMIN — PROPOFOL 50 MCG/KG/MIN: 10 INJECTION, EMULSION INTRAVENOUS at 16:10

## 2021-09-08 RX ADMIN — PHENYLEPHRINE HYDROCHLORIDE 100 MCG: 10 INJECTION INTRAVENOUS at 09:36

## 2021-09-08 RX ADMIN — ESMOLOL HYDROCHLORIDE 30 MG: 10 INJECTION, SOLUTION INTRAVENOUS at 08:47

## 2021-09-08 RX ADMIN — PROPOFOL 50 MG: 10 INJECTION, EMULSION INTRAVENOUS at 08:50

## 2021-09-08 RX ADMIN — LABETALOL HYDROCHLORIDE 10 MG: 5 INJECTION, SOLUTION INTRAVENOUS at 21:18

## 2021-09-08 RX ADMIN — PROPOFOL 50 MCG/KG/MIN: 10 INJECTION, EMULSION INTRAVENOUS at 10:16

## 2021-09-08 RX ADMIN — ESMOLOL HYDROCHLORIDE 20 MG: 10 INJECTION, SOLUTION INTRAVENOUS at 10:15

## 2021-09-08 RX ADMIN — FENTANYL CITRATE 100 MCG: 50 INJECTION, SOLUTION INTRAMUSCULAR; INTRAVENOUS at 10:16

## 2021-09-08 RX ADMIN — PROPOFOL 50 MG: 10 INJECTION, EMULSION INTRAVENOUS at 08:47

## 2021-09-08 RX ADMIN — METOPROLOL TARTRATE 2 MG: 5 INJECTION INTRAVENOUS at 17:22

## 2021-09-08 RX ADMIN — SODIUM CHLORIDE, POTASSIUM CHLORIDE, SODIUM LACTATE AND CALCIUM CHLORIDE: 600; 310; 30; 20 INJECTION, SOLUTION INTRAVENOUS at 15:03

## 2021-09-08 RX ADMIN — PROPOFOL 30 MCG/KG/MIN: 10 INJECTION, EMULSION INTRAVENOUS at 10:01

## 2021-09-08 RX ADMIN — ESMOLOL HYDROCHLORIDE 20 MG: 10 INJECTION, SOLUTION INTRAVENOUS at 10:12

## 2021-09-08 RX ADMIN — FENTANYL CITRATE 50 MCG: 50 INJECTION, SOLUTION INTRAMUSCULAR; INTRAVENOUS at 16:30

## 2021-09-08 RX ADMIN — DOCUSATE SODIUM 50 MG AND SENNOSIDES 8.6 MG 1 TABLET: 8.6; 5 TABLET, FILM COATED ORAL at 21:12

## 2021-09-08 RX ADMIN — OMEPRAZOLE 20 MG: 20 CAPSULE, DELAYED RELEASE ORAL at 22:31

## 2021-09-08 RX ADMIN — ROCURONIUM BROMIDE 20 MG: 10 INJECTION INTRAVENOUS at 10:05

## 2021-09-08 RX ADMIN — ACETAMINOPHEN 975 MG: 325 TABLET, FILM COATED ORAL at 21:12

## 2021-09-08 RX ADMIN — GENTAMICIN SULFATE 270 MG: 40 INJECTION, SOLUTION INTRAMUSCULAR; INTRAVENOUS at 08:17

## 2021-09-08 RX ADMIN — ATORVASTATIN CALCIUM 40 MG: 40 TABLET, FILM COATED ORAL at 21:11

## 2021-09-08 RX ADMIN — ROCURONIUM BROMIDE 20 MG: 10 INJECTION INTRAVENOUS at 15:22

## 2021-09-08 RX ADMIN — ROCURONIUM BROMIDE 50 MG: 10 INJECTION INTRAVENOUS at 11:56

## 2021-09-08 RX ADMIN — DEXAMETHASONE SODIUM PHOSPHATE 4 MG: 4 INJECTION, SOLUTION INTRAMUSCULAR; INTRAVENOUS at 21:14

## 2021-09-08 RX ADMIN — SUGAMMADEX 200 MG: 100 INJECTION, SOLUTION INTRAVENOUS at 17:07

## 2021-09-08 RX ADMIN — ACETAMINOPHEN 975 MG: 325 TABLET, FILM COATED ORAL at 06:52

## 2021-09-08 RX ADMIN — REMIFENTANIL HYDROCHLORIDE 0.02 MCG/KG/MIN: 1 INJECTION, POWDER, LYOPHILIZED, FOR SOLUTION INTRAVENOUS at 10:13

## 2021-09-08 RX ADMIN — FENTANYL CITRATE 50 MCG: 50 INJECTION, SOLUTION INTRAMUSCULAR; INTRAVENOUS at 08:47

## 2021-09-08 RX ADMIN — ROCURONIUM BROMIDE 30 MG: 10 INJECTION INTRAVENOUS at 13:32

## 2021-09-08 RX ADMIN — SODIUM CHLORIDE, POTASSIUM CHLORIDE, SODIUM LACTATE AND CALCIUM CHLORIDE: 600; 310; 30; 20 INJECTION, SOLUTION INTRAVENOUS at 09:16

## 2021-09-08 RX ADMIN — VANCOMYCIN HYDROCHLORIDE 1500 MG: 100 INJECTION, POWDER, LYOPHILIZED, FOR SOLUTION INTRAVENOUS at 09:00

## 2021-09-08 RX ADMIN — LABETALOL HYDROCHLORIDE 20 MG: 5 INJECTION, SOLUTION INTRAVENOUS at 23:14

## 2021-09-08 RX ADMIN — ONDANSETRON 4 MG: 2 INJECTION INTRAMUSCULAR; INTRAVENOUS at 16:41

## 2021-09-08 RX ADMIN — ROCURONIUM BROMIDE 60 MG: 10 INJECTION INTRAVENOUS at 08:12

## 2021-09-08 RX ADMIN — PROPOFOL 150 MG: 10 INJECTION, EMULSION INTRAVENOUS at 08:12

## 2021-09-08 RX ADMIN — GADOBUTROL 5 ML: 604.72 INJECTION INTRAVENOUS at 13:22

## 2021-09-08 RX ADMIN — LEVETIRACETAM 1000 MG: 500 TABLET ORAL at 21:11

## 2021-09-08 RX ADMIN — FENTANYL CITRATE 100 MCG: 50 INJECTION, SOLUTION INTRAMUSCULAR; INTRAVENOUS at 08:08

## 2021-09-08 RX ADMIN — HYDRALAZINE HYDROCHLORIDE 5 MG: 20 INJECTION INTRAMUSCULAR; INTRAVENOUS at 17:56

## 2021-09-08 RX ADMIN — ROCURONIUM BROMIDE 20 MG: 10 INJECTION INTRAVENOUS at 09:02

## 2021-09-08 RX ADMIN — Medication 5 MG: at 09:36

## 2021-09-08 RX ADMIN — DEXAMETHASONE SODIUM PHOSPHATE 10 MG: 4 INJECTION, SOLUTION INTRA-ARTICULAR; INTRALESIONAL; INTRAMUSCULAR; INTRAVENOUS; SOFT TISSUE at 08:12

## 2021-09-08 RX ADMIN — SODIUM CHLORIDE: 9 INJECTION, SOLUTION INTRAVENOUS at 22:56

## 2021-09-08 RX ADMIN — LEVETIRACETAM 1000 MG: 10 INJECTION INTRAVENOUS at 08:57

## 2021-09-08 ASSESSMENT — ACTIVITIES OF DAILY LIVING (ADL): ADLS_ACUITY_SCORE: 16

## 2021-09-08 ASSESSMENT — MIFFLIN-ST. JEOR
SCORE: 2068.25
SCORE: 2068.25

## 2021-09-08 NOTE — ANESTHESIA POSTPROCEDURE EVALUATION
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    Anesthesia Type:  General    Note:  Disposition: Admission   Postop Pain Control: Uneventful            Sign Out: Well controlled pain   PONV: No   Neuro/Psych: Uneventful            Sign Out: Acceptable/Baseline neuro status   Airway/Respiratory: Uneventful            Sign Out: Acceptable/Baseline resp. status   CV/Hemodynamics: Uneventful            Sign Out: Acceptable CV status; No obvious hypovolemia; No obvious fluid overload   Other NRE: NONE   DID A NON-ROUTINE EVENT OCCUR? No           Last vitals:  Vitals Value Taken Time   /94 09/08/21 1830   Temp     Pulse 79 09/08/21 1832   Resp     SpO2 96 % 09/08/21 1832   Vitals shown include unvalidated device data.    Electronically Signed By: Daniel Durbin MD  September 8, 2021  6:35 PM

## 2021-09-08 NOTE — ANESTHESIA CARE TRANSFER NOTE
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    Anesthesia Type:   General     Note:    Oropharynx: oropharynx clear of all foreign objects  Level of Consciousness: drowsy  Oxygen Supplementation: face mask    Independent Airway: airway patency satisfactory and stable  Dentition: dentition unchanged  Vital Signs Stable: post-procedure vital signs reviewed and stable  Report to RN Given: handoff report given  Patient transferred to: PACU    Handoff Report: Identifed the Patient, Identified the Reponsible Provider, Reviewed the pertinent medical history, Discussed the surgical course, Reviewed Intra-OP anesthesia mangement and issues during anesthesia, Set expectations for post-procedure period and Allowed opportunity for questions and acknowledgement of understanding      Vitals:  Vitals Value Taken Time   /106 09/08/21 1724   Temp     Pulse 75 09/08/21 1730   Resp     SpO2 97 % 09/08/21 1730   Vitals shown include unvalidated device data.    Electronically Signed By: SANTHOSH Larose CRNA  September 8, 2021  5:32 PM

## 2021-09-08 NOTE — PROVIDER NOTIFICATION
09/08/21 1721   Arrived From   Arrived From OR   Mode of Transport Cart     Patient accompanied by OR RN, CRNA and surgical resident.   Patient's identity verified, bedside report received.  Patient following simple commands, VSS.    Patient to CT.

## 2021-09-08 NOTE — ANESTHESIA PROCEDURE NOTES
Arterial Line Procedure Note  Pre-Procedure   Staff -        Anesthesiologist:  Iván Pederson MD       Resident/Fellow: Goldberg, Leslie, MD       Performed By: anesthesiologist and resident       Location: OR       Pre-Anesthestic Checklist: patient identified, IV checked, risks and benefits discussed, informed consent and monitors and equipment checked  Timeout:       Correct Patient: Yes        Correct Procedure: Yes        Correct Site: Yes        Correct Position: Yes   Procedure   Procedure: arterial line       Diagnosis: periprocedural        Laterality: right       Insertion Site: radial.  Sterile Prep        Standard elements of sterile barrier followed       Skin prep: Chloraprep  Insertion/Injection        Technique: ultrasound guided        1. Ultrasound was used to evaluate the access site.       2. Artery evaluated via ultrasound for patency/adequacy.       3. Using real-time ultrasound the needle/catheter was observed entering the artery/vein.       Catheter Type/Size: 20 G, 1.75 in/4.5 cm quick cath (integral wire)  Narrative         Secured by: other       Tegaderm dressing used.       Complications: None apparent,        Arterial waveform: Yes        IBP within 10% of NIBP: Yes

## 2021-09-08 NOTE — OP NOTE
OPERATIVE REPORT    PRE-OPERATIVE DIAGNOSIS:   1. hypospadias    POST-OPERATIVE DIAGNOSIS:   1.  same    PROCEDURES PERFORMED:   1. Placement of florence catheter    STAFF SURGEON: Christelle Galvin MD    ANESTHESIA: General  ESTIMATED BLOOD LOSS: none for urology portion   SPECIMEN: none for urology portion  DRAINS/TUBES: 16F florence    SIGNIFICANT FINDINGS:  1. Penile hypospadias    OPERATIVE INDICATIONS:   Brent Pino is a 41 year old male with a history of hypospadias undergoing neurosurgery who required catheter placement prior to surgery. The primary service asked that we assist with catheter placement given altered anatomy.     DESCRIPTION OF PROCEDURE:   I arrived after the patient had already been anesthetized. Exam under anesthesia revealed a hypospadic meatus at the distal third of penile shaft. The meatus was patent without evidence of stenosis. The penis was prepped and draped in normal sterile fashion. A lubricated 16F florence was advanced without difficulty and the balloon inflated once position was confirmed in the bladder with return of clear urine.     The procedure was then turned over to the primary team.     POST-OP PLAN:  - florence management per primary service  - the patient does not need urology consultation for florence placement in the future - meatus is on the ventral shaft of the penis, not on the glans.     Christelle Galvin MD

## 2021-09-08 NOTE — ANESTHESIA PROCEDURE NOTES
Airway       Patient location during procedure: OR       Procedure Start/Stop Times: 9/8/2021 8:15 AM  Staff -        Anesthesiologist:  Iván Pederson MD       Resident/Fellow: Goldberg, Leslie, MD       Performed By: residentIndications and Patient Condition       Indications for airway management: claudia-procedural       Induction type:intravenous       Mask difficulty assessment: 2 - vent by mask + OA or adjuvant +/- NMBA    Final Airway Details       Final airway type: endotracheal airway       Successful airway: ETT - single  Endotracheal Airway Details        ETT size (mm): 7.5       Cuffed: yes       Cuff volume (mL): 10       Successful intubation technique: direct laryngoscopy       DL Blade Type: MAC 4       Grade View of Cords: 2       Adjucts: stylet       Position: Center       Measured from: gums/teeth       Secured at (cm): 22       Bite block used: None    Post intubation assessment        Placement verified by: capnometry, equal breath sounds and chest rise        Number of attempts at approach: 1       Number of other approaches attempted: 0       Secured with: other (comment), cloth tape and pink tape (tegaderm)       Ease of procedure: easy       Dentition: Intact and Unchanged

## 2021-09-08 NOTE — BRIEF OP NOTE
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]  Post-operative diagnosis Same as pre-operative diagnosis    Procedure: Procedure(s):  INTRAOPERATIVE MAGNETIC RESONANCE IMAGING ASSISTED (MONTERIS) LASER ABLATION, AND STEREOTACTIC NEEDLE BIOPSY  Surgeon: Surgeon(s) and Role:     * Alejandro Matson MD - Primary     * Milly Odom MD - Resident - Assisting     * Christelle Mullen MD - Resident - Assisting     * Sohail Oleary MD - Resident - Assisting  Anesthesia: General   Estimated blood loss: Minimal  Drains: None  Specimens:   ID Type Source Tests Collected by Time Destination   1 : right brain tumor Tissue Brain SURGICAL PATHOLOGY EXAM Alejandro Matson MD 9/8/2021 12:44 PM    2 : Right brain tumor Tissue Brain SURGICAL PATHOLOGY EXAM Alejandro Matson MD 9/8/2021 12:47 PM    3 : right brain tumor 1 Tissue Brain SURGICAL PATHOLOGY EXAM Alejandro Matson MD 9/8/2021  2:16 PM    4 : right brain tumor 2 Tissue Brain SURGICAL PATHOLOGY EXAM Alejandro Matson MD 9/8/2021  2:16 PM    5 : right brain tumor 3 Tissue Brain SURGICAL PATHOLOGY EXAM Alejandro Matson MD 9/8/2021  2:17 PM    6 : right brain tumor 4 Tissue Brain SURGICAL PATHOLOGY EXAM Alejandro Matson MD 9/8/2021  2:18 PM    7 : right brain tumor 5 Tissue Brain SURGICAL PATHOLOGY EXAM Alejandro Matson MD 9/8/2021  2:18 PM    8 : right brain tumor 6 Tissue Brain SURGICAL PATHOLOGY EXAM Alejandro Matson MD 9/8/2021  2:19 PM      Findings:   Frozen pathology: atypia, hyperproliferation. Permanent sample sent.  Complications: None.  Implants: * No implants in log *    Milly Weaver MD  Neurosurgery PGY2

## 2021-09-09 ENCOUNTER — APPOINTMENT (OUTPATIENT)
Dept: CT IMAGING | Facility: CLINIC | Age: 41
DRG: 025 | End: 2021-09-09
Attending: STUDENT IN AN ORGANIZED HEALTH CARE EDUCATION/TRAINING PROGRAM
Payer: COMMERCIAL

## 2021-09-09 VITALS
WEIGHT: 259.7 LBS | HEIGHT: 70 IN | HEART RATE: 84 BPM | BODY MASS INDEX: 37.18 KG/M2 | SYSTOLIC BLOOD PRESSURE: 150 MMHG | TEMPERATURE: 97.9 F | OXYGEN SATURATION: 97 % | DIASTOLIC BLOOD PRESSURE: 98 MMHG | RESPIRATION RATE: 24 BRPM

## 2021-09-09 LAB
ANION GAP SERPL CALCULATED.3IONS-SCNC: 12 MMOL/L (ref 3–14)
BUN SERPL-MCNC: 13 MG/DL (ref 7–30)
CALCIUM SERPL-MCNC: 9.1 MG/DL (ref 8.5–10.1)
CHLORIDE BLD-SCNC: 105 MMOL/L (ref 94–109)
CO2 SERPL-SCNC: 24 MMOL/L (ref 20–32)
CREAT SERPL-MCNC: 1.02 MG/DL (ref 0.66–1.25)
ERYTHROCYTE [DISTWIDTH] IN BLOOD BY AUTOMATED COUNT: 12.4 % (ref 10–15)
GFR SERPL CREATININE-BSD FRML MDRD: 90 ML/MIN/1.73M2
GLUCOSE BLD-MCNC: 121 MG/DL (ref 70–99)
HCT VFR BLD AUTO: 42 % (ref 40–53)
HGB BLD-MCNC: 14.3 G/DL (ref 13.3–17.7)
MAGNESIUM SERPL-MCNC: 2 MG/DL (ref 1.6–2.3)
MCH RBC QN AUTO: 30.2 PG (ref 26.5–33)
MCHC RBC AUTO-ENTMCNC: 34 G/DL (ref 31.5–36.5)
MCV RBC AUTO: 89 FL (ref 78–100)
PHOSPHATE SERPL-MCNC: 3.4 MG/DL (ref 2.5–4.5)
PLATELET # BLD AUTO: 314 10E3/UL (ref 150–450)
POTASSIUM BLD-SCNC: 3.7 MMOL/L (ref 3.4–5.3)
RBC # BLD AUTO: 4.73 10E6/UL (ref 4.4–5.9)
SODIUM SERPL-SCNC: 141 MMOL/L (ref 133–144)
SP GR UR STRIP: 1.01 (ref 1–1.03)
WBC # BLD AUTO: 15.1 10E3/UL (ref 4–11)

## 2021-09-09 PROCEDURE — 250N000012 HC RX MED GY IP 250 OP 636 PS 637: Performed by: NURSE PRACTITIONER

## 2021-09-09 PROCEDURE — 81003 URINALYSIS AUTO W/O SCOPE: CPT | Performed by: STUDENT IN AN ORGANIZED HEALTH CARE EDUCATION/TRAINING PROGRAM

## 2021-09-09 PROCEDURE — 36415 COLL VENOUS BLD VENIPUNCTURE: CPT | Performed by: STUDENT IN AN ORGANIZED HEALTH CARE EDUCATION/TRAINING PROGRAM

## 2021-09-09 PROCEDURE — 85027 COMPLETE CBC AUTOMATED: CPT | Performed by: STUDENT IN AN ORGANIZED HEALTH CARE EDUCATION/TRAINING PROGRAM

## 2021-09-09 PROCEDURE — 250N000013 HC RX MED GY IP 250 OP 250 PS 637: Performed by: STUDENT IN AN ORGANIZED HEALTH CARE EDUCATION/TRAINING PROGRAM

## 2021-09-09 PROCEDURE — 70450 CT HEAD/BRAIN W/O DYE: CPT

## 2021-09-09 PROCEDURE — 83735 ASSAY OF MAGNESIUM: CPT | Performed by: STUDENT IN AN ORGANIZED HEALTH CARE EDUCATION/TRAINING PROGRAM

## 2021-09-09 PROCEDURE — 80048 BASIC METABOLIC PNL TOTAL CA: CPT | Performed by: STUDENT IN AN ORGANIZED HEALTH CARE EDUCATION/TRAINING PROGRAM

## 2021-09-09 PROCEDURE — 70450 CT HEAD/BRAIN W/O DYE: CPT | Mod: 26 | Performed by: RADIOLOGY

## 2021-09-09 PROCEDURE — 84100 ASSAY OF PHOSPHORUS: CPT | Performed by: STUDENT IN AN ORGANIZED HEALTH CARE EDUCATION/TRAINING PROGRAM

## 2021-09-09 PROCEDURE — 250N000011 HC RX IP 250 OP 636: Performed by: STUDENT IN AN ORGANIZED HEALTH CARE EDUCATION/TRAINING PROGRAM

## 2021-09-09 RX ORDER — DEXAMETHASONE 1 MG
1 TABLET ORAL EVERY 8 HOURS SCHEDULED
Status: DISCONTINUED | OUTPATIENT
Start: 2021-09-15 | End: 2021-09-09 | Stop reason: HOSPADM

## 2021-09-09 RX ORDER — DEXAMETHASONE 4 MG/1
4 TABLET ORAL EVERY 8 HOURS SCHEDULED
Status: DISCONTINUED | OUTPATIENT
Start: 2021-09-09 | End: 2021-09-09 | Stop reason: HOSPADM

## 2021-09-09 RX ORDER — POLYETHYLENE GLYCOL 3350 17 G/17G
17 POWDER, FOR SOLUTION ORAL DAILY
Qty: 14 PACKET | Refills: 0 | Status: SHIPPED | OUTPATIENT
Start: 2021-09-09 | End: 2021-09-23

## 2021-09-09 RX ORDER — DEXAMETHASONE 2 MG/1
2 TABLET ORAL EVERY 8 HOURS
Qty: 6 TABLET | Refills: 0 | Status: SHIPPED | OUTPATIENT
Start: 2021-09-13 | End: 2021-09-09

## 2021-09-09 RX ORDER — DEXAMETHASONE 1.5 MG/1
3 TABLET ORAL EVERY 8 HOURS
Qty: 12 TABLET | Refills: 0 | Status: SHIPPED | OUTPATIENT
Start: 2021-09-11 | End: 2021-09-09

## 2021-09-09 RX ORDER — DEXAMETHASONE 1 MG
1 TABLET ORAL EVERY 8 HOURS
Qty: 6 TABLET | Refills: 0 | Status: SHIPPED | OUTPATIENT
Start: 2021-09-21 | End: 2021-09-25

## 2021-09-09 RX ORDER — DEXAMETHASONE 1 MG
3 TABLET ORAL EVERY 8 HOURS SCHEDULED
Status: DISCONTINUED | OUTPATIENT
Start: 2021-09-11 | End: 2021-09-09 | Stop reason: HOSPADM

## 2021-09-09 RX ORDER — DEXAMETHASONE 4 MG/1
4 TABLET ORAL EVERY 8 HOURS
Qty: 6 TABLET | Refills: 0 | Status: SHIPPED | OUTPATIENT
Start: 2021-09-09 | End: 2021-09-09

## 2021-09-09 RX ORDER — DEXAMETHASONE 2 MG/1
2 TABLET ORAL EVERY 8 HOURS
Qty: 6 TABLET | Refills: 0 | Status: SHIPPED | OUTPATIENT
Start: 2021-09-17 | End: 2021-09-21

## 2021-09-09 RX ORDER — AMOXICILLIN 250 MG
1 CAPSULE ORAL 2 TIMES DAILY
Qty: 28 TABLET | Refills: 0 | Status: SHIPPED | OUTPATIENT
Start: 2021-09-09 | End: 2021-09-23

## 2021-09-09 RX ORDER — DEXAMETHASONE 1 MG
1 TABLET ORAL EVERY 8 HOURS
Qty: 6 TABLET | Refills: 0 | Status: SHIPPED | OUTPATIENT
Start: 2021-09-15 | End: 2021-09-09

## 2021-09-09 RX ORDER — DEXAMETHASONE 1 MG
2 TABLET ORAL EVERY 8 HOURS SCHEDULED
Status: DISCONTINUED | OUTPATIENT
Start: 2021-09-13 | End: 2021-09-09 | Stop reason: HOSPADM

## 2021-09-09 RX ORDER — CEPHALEXIN 500 MG/1
500 CAPSULE ORAL 2 TIMES DAILY
Qty: 14 CAPSULE | Refills: 0 | Status: SHIPPED | OUTPATIENT
Start: 2021-09-09 | End: 2021-09-16

## 2021-09-09 RX ORDER — DEXAMETHASONE 1.5 MG/1
3 TABLET ORAL EVERY 8 HOURS
Qty: 12 TABLET | Refills: 0 | Status: SHIPPED | OUTPATIENT
Start: 2021-09-13 | End: 2021-09-17

## 2021-09-09 RX ORDER — DEXAMETHASONE 4 MG/1
4 TABLET ORAL EVERY 8 HOURS
Qty: 6 TABLET | Refills: 0 | Status: SHIPPED | OUTPATIENT
Start: 2021-09-09 | End: 2021-09-13

## 2021-09-09 RX ORDER — OXYCODONE HYDROCHLORIDE 5 MG/1
5 TABLET ORAL EVERY 4 HOURS PRN
Qty: 20 TABLET | Refills: 0 | Status: SHIPPED | OUTPATIENT
Start: 2021-09-09

## 2021-09-09 RX ADMIN — DEXAMETHASONE SODIUM PHOSPHATE 4 MG: 4 INJECTION, SOLUTION INTRAMUSCULAR; INTRAVENOUS at 04:14

## 2021-09-09 RX ADMIN — CEFAZOLIN 1 G: 1 INJECTION, POWDER, FOR SOLUTION INTRAMUSCULAR; INTRAVENOUS at 04:14

## 2021-09-09 RX ADMIN — LEVETIRACETAM 1000 MG: 500 TABLET ORAL at 08:47

## 2021-09-09 RX ADMIN — DEXAMETHASONE 4 MG: 4 TABLET ORAL at 09:41

## 2021-09-09 RX ADMIN — POLYETHYLENE GLYCOL 3350 17 G: 17 POWDER, FOR SOLUTION ORAL at 08:47

## 2021-09-09 RX ADMIN — ACETAMINOPHEN 975 MG: 325 TABLET, FILM COATED ORAL at 04:14

## 2021-09-09 ASSESSMENT — MIFFLIN-ST. JEOR: SCORE: 2084.25

## 2021-09-09 ASSESSMENT — ACTIVITIES OF DAILY LIVING (ADL)
ADLS_ACUITY_SCORE: 18

## 2021-09-09 NOTE — DISCHARGE SUMMARY
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]     In addition to the assessment of diagnoses detailed above, this 41 year old male  patient admitted from home who has the following conditions contributing to the complexity of their medical care:    Brain compression which was evident on the CT/MRI.,  Oligodendroglioma          Procedures:   9/9/2021  INTRAOPERATIVE MAGNETIC RESONANCE IMAGING ASSISTED (MONTERIS) LASER ABLATION, AND STEREOTACTIC NEEDLE BIOPSY           Brief History of Illness:   41 M with anaplastic oligodendroglioma initially diagnosed in 2014 (s/p subtotal resection followed by RT/TMZ and maintenance TMZ until 2015) who suffered multiple episodes of memory lapse and confusion over the past week. The episodes resolved after institution of Keppra therapy. MRI of the brain on 8/24/2021 revealed patchy new contrast enhancement in the right mesial temporal region as well as a new 4 mm focus of enhancement int right superior temporal gyrus. There is associated FLAIR hyper-intensity surrounding these regions of enhancement.            Hospital Course:   Following surgery the patient was monitored in the Neurosurgical ICU, patient remained medically and neurologically stable.  Patient denied incisional pain and headache, nausea/vomiting, aphasia,  Or weakness.    Post operatively head CT's were obtained and revealed no acute hemorrhage.    Patient was started on Decadron taper post operatively and will continue this upon discharge.  Patient also started on Protonix for GI prophylaxis.   Patient will continue his home dose of Keppra upon  discharge.  Keflex for 7 days was prescribed upon discharge for wound prophylaxis.   Surgical pathology was pending upon discharge.   Patient will need to follow up in Neurosurgical clinic in 2 weeks for wound check.  Patient will follow up with Neuro Oncology to discuss future treatment plan.   Prior to discharge patient was medically stable and at neurologic baseline, tolerating diet, ambulating safely, voiding, passing flatus, and pain control was achieved with oral medications.            Discharge Medications:     Current Discharge Medication List      START taking these medications    Details   cephALEXin (KEFLEX) 500 MG capsule Take 1 capsule (500 mg) by mouth 2 times daily for 7 days  Qty: 14 capsule, Refills: 0    Associated Diagnoses: Oligodendroglioma (H)      !! dexamethasone (DECADRON) 1 MG tablet Take 1 tablet (1 mg) by mouth every 8 hours for 2 days  Qty: 6 tablet, Refills: 0    Comments: Taper 4  Associated Diagnoses: Oligodendroglioma (H)      !! dexamethasone (DECADRON) 1.5 MG tablet Take 2 tablets (3 mg) by mouth every 8 hours for 2 days  Qty: 12 tablet, Refills: 0    Comments: Taper 2  Associated Diagnoses: Oligodendroglioma (H)      !! dexamethasone (DECADRON) 2 MG tablet Take 1 tablet (2 mg) by mouth every 8 hours for 2 days  Qty: 6 tablet, Refills: 0    Comments: Taper 3  Associated Diagnoses: Oligodendroglioma (H)      !! dexamethasone (DECADRON) 4 MG tablet Take 1 tablet (4 mg) by mouth every 8 hours for 2 days  Qty: 6 tablet, Refills: 0    Comments: Taper 1  Associated Diagnoses: Oligodendroglioma (H)      oxyCODONE (ROXICODONE) 5 MG tablet Take 1 tablet (5 mg) by mouth every 4 hours as needed  Qty: 20 tablet, Refills: 0    Associated Diagnoses: Oligodendroglioma (H)      polyethylene glycol (MIRALAX) 17 g packet Take 17 g by mouth daily for 14 days  Qty: 14 packet, Refills: 0    Associated Diagnoses: Oligodendroglioma (H)      senna-docusate (SENOKOT-S/PERICOLACE) 8.6-50 MG tablet Take 1  "tablet by mouth 2 times daily for 14 days  Qty: 28 tablet, Refills: 0    Associated Diagnoses: Oligodendroglioma (H)       !! - Potential duplicate medications found. Please discuss with provider.      CONTINUE these medications which have NOT CHANGED    Details   acetaminophen (TYLENOL) 650 MG CR tablet Take 1,300 mg by mouth every 8 hours as needed       atorvastatin (LIPITOR) 40 MG tablet every evening       hydrochlorothiazide (HYDRODIURIL) 12.5 MG tablet Take 25 mg by mouth every evening       levETIRAcetam (KEPPRA) 1000 MG tablet Take 1,000 mg by mouth      omeprazole (PRILOSEC) 20 MG DR capsule every evening       sildenafil (REVATIO) 20 MG tablet daily as needed          STOP taking these medications       ibuprofen (ADVIL/MOTRIN) 200 MG tablet Comments:   Reason for Stopping:           Exam:   Physical Exam  /87   Pulse 84   Temp 97.9  F (36.6  C) (Oral)   Resp 30   Ht 1.778 m (5' 10\")   Wt 117.8 kg (259 lb 11.2 oz)   SpO2 98%   BMI 37.26 kg/m    General: Appears comfortable, NAD  Wound: Incision, clean, dry, intact without strikethrough  Neurologic Exam:  - AOx3.  - Follows commands.  - Speech fluent, spontaneous. No aphasia or dysarthria.  - No gaze preference. No apparent hemineglect.  - PERRL, EOMI.  - Face symmetric with sensation intact to light touch.  - Palate elevates symmetrically, uvula midline, tongue protrudes midline.  - Trapezii and sternocleidomastoid muscles 5/5 bilaterally.  - No pronator drift.  Motor: Normal bulk/tone; no tremor, rigidity, or bradykinesia.   Right:  Deltoid 5/5, tricep 5/5, bicep 5/5, wrist flexor 5/5, wrist extensor 5/5, finger intrinsic 5/5  Left:  Deltoid 5/5, tricep 5/5, bicep 5/5, wrist flexor 5/5, wrist extensor 5/5, finger intrinsic 5/5  Right: Iliopsoas 5/5, quadricep 5/5, hamstring 5/5, tibialis anterior 5/5, gastrocnemius 5/5, EHL 5/5  Left:  Iliopsoas 5/5, quadricep 5/5, hamstring 5/5, tibialis anterior 5/5, gastrocnemius 5/5, EHL 5/5    Sensation " intact in bilateral L4-S1 dermatones               Discharge Instructions and Follow-Up:     Discharge diet: Regular   Discharge activity: You may advance activity as tolerated. No strenuous exercise or heay lifting greater than 10 lbs for 4 weeks or until seen and cleared in clinic.   Discharge follow-up:     Follow-up Dr. Alejandro Matson MD on 9/24/21 @ 1:15 pm, all additional follow-up visits to be determined by Dr. Alejandro Matson MD       Wound care: Ok to shower,however no scrubbing of the wound and no soaking of the wound, meaning no bathtubs or swimming pools. Pat dry only. Leave wound open to air.  Patient to have wound checked 2 weeks following surgery.    Wound location: cranial  Closure technique: monocryl  Dressing needs: none   Post-op care at follow-up: Keep dry and clean     Please call if you have:  1. increased pain, redness, drainage, swelling at your incision  2. fevers > 101.5 F degrees  3. with any questions or concerns.  You may reach the Neurosurgery clinic at [phone] during regular work hours. ER at [phone].    and ask for the Neurosurgery Resident on call at [phone], during off hours or weekends.         Discharge Disposition:     Discharged to home        SANTHOSH Castle, CNP  Department of Neurosurgery  Pager: [phone]

## 2021-09-09 NOTE — PROVIDER NOTIFICATION
09/08/21 1918   Family Notification   Family Notified Made   Patient's current phase of care: PACU   Person(s) updated on plan of care: Family/Significant Other   Contact Information for Surgery   Contact (Name and Relationship) - During Surgery Roopa Pino   Contact Number - During Surgery [phone]     Patient able to talk to his wife on the phone.    Roopa will check in at ICU.

## 2021-09-09 NOTE — OP NOTE
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 The patient presents for biopsy and ablation of this region.     Description of Procedure: After pre-operative laboratory value and informed consent were verified, the patient was brought into the operating room. The patient underwent general anesthesia and intubation. Antibiotic was administered.     The patient was placed in a supine position, with head pinned in a neutral position. The right frontal area was prepped in a sterile manner. A Smart Grid was applied over the approximate entry point of the planned trajectory. The MRI was brought into the room for imaging of the lesion.      Based on the lesion, the planned entry point was identified.  The [place] frame was mounted.  Trajectory was adjusted based on real time MR imaging until the desired trajectory was achieved.       A 3 mm incision was made over the planned entry point. A 3 mm [place] hole was made to support the planned trajectory.  A ceramic stylet was inserted to confirm the site of biopsy. Another intra-operative MRI was performed to confirm trajectory.      After trajectory confirmation, the stylet was removed. An MRI compatible biopsy needle was inserted to the prescribed depth. A total of seven biopsies were taken.    Frozen pathology revealed findings consistent with tumor recurrence, including increased cellularity and atypia.     The biopsy needle was removed, and the Monteris laser probe was inserted to the prescribed depth. The entirety of the  contrast enhancing region was ablated in two ALYSON treatments in sequential depth.      After hemostasis, I turned my attention to the closure.  The wound was amply irrigated with bacitracin containing saline. The skin incision was approximated with a 3'0 Monocryl, and Exofin was applied.     I was present and performed the key portions of the procedure.     EBL:   < 5 cc's    Specimens:  Seven biopsy specimens.     Disposition: ICU

## 2021-09-09 NOTE — PLAN OF CARE
Admitted/transferred from: PACU @ ~2000  Reason for admission/transfer: Post op monitoring  2 RN skin assessment: completed by Clement GUTIERREZ, and Nikolai SCOTT  Result of skin assessment and interventions/actions: Skin good other than procedural head sites.   Height, weight, drug calc weight: Done  Patient belongings (see Flowsheet): Pt's wife left his cell phone and  at bedside -- locked in safe. Pt's wife also said pt's clothes were down in pre-op possibly.   MDRO education added to care planN/A  ?

## 2021-09-09 NOTE — PLAN OF CARE
Neuro: Pt stable upon arrival to SICU. Fully neuro intact, except forgetful/short term memory loss. A&Ox4. PERRLA. Pt tends to pull at lines and take off his monitoring cables. Pt may have pulled out his A-line -- VSS after and MD notified. Minor headache incisional pain controlled with scheduled tylenol. Denied and numb/ting or N/V.  Resp: Pt weaned from 1LNC to RA by end of shift. Sats good, LS clear.   CV: Sinus rhythm with known BBB. 1x very brief episode of tachy in 130s, could have been EKG interference when pt moving. Total of 30mg PRN Labetalol given last night to sustain SBP goal <140. Pulses good, afebrile.  : UOP ~150-350/hr thru florence cath. NSG notified and specific gravity tested - 1.01.    GI: Diet advanced to liquids; swallowing pills adequately. Swollen R side tongue from ETT, MDs aware and not affecting breathing or swallowing. No BM, senna given.  Skin: Head procedural sites look good.   Activity:   Labs:   Misc: Wife Roopa at bedside last night. Dr Matson updated both pt and wife at bedside.      Plan: Continue to monitor neuro status and discharge today if possible.

## 2021-09-13 LAB
PATH REPORT.COMMENTS IMP SPEC: NORMAL
PATH REPORT.FINAL DX SPEC: NORMAL
PATH REPORT.GROSS SPEC: NORMAL
PATH REPORT.INTRAOP OBS SPEC DOC: NORMAL
PATH REPORT.MICROSCOPIC SPEC OTHER STN: NORMAL
PATH REPORT.RELEVANT HX SPEC: NORMAL
PHOTO IMAGE: NORMAL

## 2021-09-13 PROCEDURE — 88331 PATH CONSLTJ SURG 1 BLK 1SPC: CPT | Mod: 26 | Performed by: PATHOLOGY

## 2021-09-13 PROCEDURE — 88307 TISSUE EXAM BY PATHOLOGIST: CPT | Mod: 26 | Performed by: PATHOLOGY

## 2021-09-13 PROCEDURE — 88342 IMHCHEM/IMCYTCHM 1ST ANTB: CPT | Mod: 26 | Performed by: PATHOLOGY

## 2021-09-13 NOTE — PROGRESS NOTES
Brief Neurosurgery Note - Addendum to Brief Op Note on 9/8/21 4:39 pm    Intraoperative biopsy samples sent:  1. First  Needle placement: 2 samples sent  2. Second needle placement: 5 samples sent     Total samples sent: 7 samples        Milly Weaver MD  Neurosurgery PGY2    Please contact neurosurgery resident on call with questions.    Dial * * *[phone], enter [phone] when prompted.

## 2021-09-13 NOTE — PROGRESS NOTES
[id number]: Informed Consent Note     The consent form, including purpose, risks and benefits, was reviewed with Brent Pino, and all questions were answered before he signed the consent form. The patient understands that the study involves an active treatment phase as well as a post-treatment follow up phase.     The patient Brent Pino and I were present during consent. No procedures specific to this study were performed prior to the patient signing the consent form. A copy of the signed form was provided to the patient.    Consent Version Date: 1/8/2021  Consent obtained by: Alejandro Matson MD, PhD   Date: 9/8/2021  HIPAA authorization signed?: yes  HIPAA Form date: 7/18/2016    Alejandro Matson MD, PhD    Form 503.03.01 (Version 2)     Effective date: 01AUG2018     Next Review Date: 01AUG2020

## 2021-09-24 ENCOUNTER — VIRTUAL VISIT (OUTPATIENT)
Dept: NEUROSURGERY | Facility: CLINIC | Age: 41
End: 2021-09-24
Attending: NEUROLOGICAL SURGERY
Payer: COMMERCIAL

## 2021-09-24 DIAGNOSIS — C71.9 OLIGODENDROGLIOMA (H): Primary | ICD-10-CM

## 2021-09-24 PROCEDURE — 999N000109 HC STATISTIC OP CR VISIT

## 2021-09-24 NOTE — LETTER
9/24/2021         RE: Brent Pino  [address] [facility] [place] [address]        Dear Colleague,    Thank you for referring your patient, Brent Pino, to the [facility]. Please see a copy of my visit note below.    Brent is a 41 year old who is being evaluated via a billable video visit.       Brent Pino stated he is currently in the state of [place] for visit today.    How would you like to obtain your AVS? Mail a copy  If the video visit is dropped, the invitation should be resent by: Text to cell phone: [phone]  Will anyone else be joining your video visit? Yes: wife. How would they like to receive their invitation? Other e-mail: N/A      Video Start Time: 2:30 PM  Video-Visit Details    Type of service:  Video Visit    Video End Time: 2:45 PM    Originating Location (pt. Location): Home    Distant Location (provider location):  [facility]     Platform used for Video Visit: Cullen Goins, Virtual Visit Facilitator    Neurosurgery Post-operative visit    41 M PMH notable for oligodendroglioma s/p biopsy and laser ablation of a right mesial temporal contrast enhancing lesion. Operative and post-operative course unremarkable. The patient was discharged on POD1. The final pathology showed no evidence of active tumor in any of the biopsy samples.    No new complaints since discharge.    Grossly non-focal motor examination.     AP: 41 M doing well after biopsy and laser ablation of the right mesial temporal contrast enhancing lesion. Pathology shows no evidence of tumor recurrence.  I have reviewed the findings with the patient's neurologist, Dr. Vitale. He will continue to monitor the remaining lesion. Since the mesial temporal lesion is ablated, I have asked Dr. Vitale to consider tapering of the patient's AEDs three months after surgery. I have reviewed these communications with Dr. Vitale with the patient.    I will continue to  follow this patient through communications with Dr. Vitale.        Again, thank you for allowing me to participate in the care of your patient.        Sincerely,        Alejandro Matson MD

## 2021-09-24 NOTE — NURSING NOTE
Brent Pino 41 year old male presents today to discuss pathology results from procedure and next steps.  Christina Goins, Virtual Visit Facilitator

## 2021-09-24 NOTE — PROGRESS NOTES
Brent is a 41 year old who is being evaluated via a billable video visit.       Brent Pino stated he is currently in the state [facility] for visit today.    How would you like to obtain your AVS? Mail a copy  If the video visit is dropped, the invitation should be resent by: Text to cell phone: [phone]  Will anyone else be joining your video visit? Yes: wife. How would they like to receive their invitation? Other e-mail: N/A      Video Start Time: 2:30 PM  Video-Visit Details    Type of service:  Video Visit    Video End Time: 2:45 PM    Originating Location (pt. Location): Home    Distant Location (provider location):  [facility]     Platform used for Video Visit: Cullen Goins, Virtual Visit Facilitator    Neurosurgery Post-operative visit    41 M PMH notable for oligodendroglioma s/p biopsy and laser ablation of a right mesial temporal contrast enhancing lesion. Operative and post-operative course unremarkable. The patient was discharged on POD1. The final pathology showed no evidence of active tumor in any of the biopsy samples.    No new complaints since discharge.    Grossly non-focal motor examination.     AP: 41 M doing well after biopsy and laser ablation of the right mesial temporal contrast enhancing lesion. Pathology shows no evidence of tumor recurrence.  I have reviewed the findings with the patient's neurologist, Dr. Vitale. He will continue to monitor the remaining lesion. Since the mesial temporal lesion is ablated, I have asked Dr. Vitale to consider tapering of the patient's AEDs three months after surgery. I have reviewed these communications with Dr. Vitale with the patient.    I will continue to follow this patient through communications with Dr. Vitale.

## 2021-11-02 ENCOUNTER — RESEARCH ENCOUNTER (OUTPATIENT)
Dept: ONCOLOGY | Facility: CLINIC | Age: 41
End: 2021-11-02

## 2021-11-03 NOTE — PROGRESS NOTES
[id number]: Study Visit Note   Subject name: Brent Pino     Visit: 1M Follow-up      Did the study visit occur within the appropriate window allowed by the protocol? yes    If no, why? N/A    Since the last study visit, He has been doing been doing well.     I have personally interviewed Brent Pino and reviewed his medical record for adverse events and concomitant medications and these have been recorded on the corresponding logs in Brent Pino's research file.     The FACT-Br and EQ-5D-3L surveys were completed for this visit over the phone. Brent Pino was given the opportunity to ask any trial related questions.     Luis Manuel Grover  Clinical Research Coordinator.

## 2022-01-16 ENCOUNTER — HEALTH MAINTENANCE LETTER (OUTPATIENT)
Age: 42
End: 2022-01-16

## 2023-04-23 ENCOUNTER — HEALTH MAINTENANCE LETTER (OUTPATIENT)
Age: 43
End: 2023-04-23

## 2024-06-30 ENCOUNTER — HEALTH MAINTENANCE LETTER (OUTPATIENT)
Age: 44
End: 2024-06-30

## (undated) DEVICE — CATH TRAY FOLEY SURESTEP 16FR W/URNE MTR STLK LATEX A303316A

## (undated) DEVICE — PIN SKULL MAYFIELD ADULT TITANIUM 3/PK A1120

## (undated) DEVICE — NDL KIT MRI BIOPSY 28CM MDBM-08-28X

## (undated) DEVICE — SOL NACL 0.9% IRRIG 1000ML BOTTLE 2F7124

## (undated) DEVICE — PACK GOWN 3/PK DISP XL SBA32GPFCB

## (undated) DEVICE — NEUROBLATE OPTIC FULLFIRE LASER PROBE

## (undated) DEVICE — Device

## (undated) DEVICE — SU MONOCRYL 3-0 PS-1 27" Y936H

## (undated) DEVICE — SMARTFRAME XG TWISTPOINT KIT

## (undated) DEVICE — BLADE CLIPPER SGL USE 9680

## (undated) DEVICE — ADH SKIN CLOSURE PREMIERPRO EXOFIN 1.0ML 3470

## (undated) DEVICE — PREP CHLORAPREP CLEAR 3ML 260400

## (undated) DEVICE — STPL SKIN 35W ROTATING HEAD PRW35

## (undated) DEVICE — MR SCANNER BORE DRAPE WITH EXTENSION

## (undated) DEVICE — COVER CAMERA IN-LIGHT DISP LT-C02

## (undated) DEVICE — RX BACITRACIN OINTMENT 0.9G 1/32OZ CUR001109

## (undated) DEVICE — SOL WATER IRRIG 1000ML BOTTLE 2F7114

## (undated) DEVICE — DRSG KERLIX 4 1/2"X4YDS ROLL 6715

## (undated) DEVICE — LINEN TOWEL PACK X30 5481

## (undated) DEVICE — DRSG TELFA 3X8" 1238

## (undated) DEVICE — PACK NEURO MINOR UMMC SNE32MNMU4

## (undated) DEVICE — PREP DURAPREP 26ML APL 8630

## (undated) DEVICE — GLOVE PROTEXIS BLUE W/NEU-THERA 7.5  2D73EB75

## (undated) DEVICE — DRAPE IOBAN INCISE 13X13" 6640EZ

## (undated) DEVICE — GLOVE PROTEXIS MICRO 7.0  2D73PM70

## (undated) RX ORDER — ACETAMINOPHEN 325 MG/1
TABLET ORAL
Status: DISPENSED
Start: 2021-09-08

## (undated) RX ORDER — LIDOCAINE HYDROCHLORIDE 20 MG/ML
JELLY TOPICAL
Status: DISPENSED
Start: 2021-09-08

## (undated) RX ORDER — ONDANSETRON 2 MG/ML
INJECTION INTRAMUSCULAR; INTRAVENOUS
Status: DISPENSED
Start: 2021-09-08

## (undated) RX ORDER — PROPOFOL 10 MG/ML
INJECTION, EMULSION INTRAVENOUS
Status: DISPENSED
Start: 2021-09-08

## (undated) RX ORDER — FENTANYL CITRATE 50 UG/ML
INJECTION, SOLUTION INTRAMUSCULAR; INTRAVENOUS
Status: DISPENSED
Start: 2021-09-08

## (undated) RX ORDER — SODIUM CHLORIDE, SODIUM LACTATE, POTASSIUM CHLORIDE, CALCIUM CHLORIDE 600; 310; 30; 20 MG/100ML; MG/100ML; MG/100ML; MG/100ML
INJECTION, SOLUTION INTRAVENOUS
Status: DISPENSED
Start: 2021-09-08

## (undated) RX ORDER — HYDRALAZINE HYDROCHLORIDE 20 MG/ML
INJECTION INTRAMUSCULAR; INTRAVENOUS
Status: DISPENSED
Start: 2021-09-08